# Patient Record
Sex: MALE | Race: WHITE | NOT HISPANIC OR LATINO | Employment: UNEMPLOYED | URBAN - METROPOLITAN AREA
[De-identification: names, ages, dates, MRNs, and addresses within clinical notes are randomized per-mention and may not be internally consistent; named-entity substitution may affect disease eponyms.]

---

## 2017-02-01 DIAGNOSIS — R56.9 SEIZURES (HCC): ICD-10-CM

## 2017-02-01 RX ORDER — LEVOCARNITINE 330 MG/1
TABLET ORAL
COMMUNITY
Start: 2017-01-04 | End: 2017-02-01 | Stop reason: SDUPTHER

## 2017-02-02 RX ORDER — DIVALPROEX SODIUM 500 MG/1
TABLET, DELAYED RELEASE ORAL
Qty: 150 TAB | Refills: 0 | Status: SHIPPED | OUTPATIENT
Start: 2017-02-02 | End: 2017-02-27 | Stop reason: SDUPTHER

## 2017-02-02 RX ORDER — LEVOCARNITINE 330 MG/1
330 TABLET ORAL DAILY
Qty: 30 TAB | Refills: 0 | Status: SHIPPED | OUTPATIENT
Start: 2017-02-02 | End: 2017-02-27 | Stop reason: SDUPTHER

## 2017-02-02 NOTE — TELEPHONE ENCOUNTER
Was the patient seen in the last year in this department? Yes  2/17/16    Does patient have an active prescription for medications requested? Yes     Received Request Via: Pharmacy     No follow up scheduled at this time.

## 2017-02-27 DIAGNOSIS — R56.9 SEIZURES (HCC): ICD-10-CM

## 2017-02-27 NOTE — TELEPHONE ENCOUNTER
Was the patient seen in the last year in this department? No  2/17/16    Does patient have an active prescription for medications requested? Yes     Received Request Via: Pharmacy    No follow up scheduled at this time.

## 2017-03-02 RX ORDER — DIVALPROEX SODIUM 500 MG/1
TABLET, DELAYED RELEASE ORAL
Qty: 75 TAB | Refills: 0 | Status: SHIPPED | OUTPATIENT
Start: 2017-03-02 | End: 2017-03-29 | Stop reason: SDUPTHER

## 2017-03-02 RX ORDER — LEVETIRACETAM 500 MG/1
1500 TABLET ORAL 2 TIMES DAILY
Qty: 90 TAB | Refills: 0 | Status: SHIPPED | OUTPATIENT
Start: 2017-03-02 | End: 2017-03-29 | Stop reason: SDUPTHER

## 2017-03-02 RX ORDER — LEVOCARNITINE 330 MG/1
330 TABLET ORAL DAILY
Qty: 30 TAB | Refills: 0 | Status: SHIPPED | OUTPATIENT
Start: 2017-03-02 | End: 2017-03-29 | Stop reason: SDUPTHER

## 2017-04-17 ENCOUNTER — OFFICE VISIT (OUTPATIENT)
Dept: NEUROLOGY | Facility: MEDICAL CENTER | Age: 52
End: 2017-04-17
Payer: MEDICAID

## 2017-04-17 VITALS
WEIGHT: 165 LBS | TEMPERATURE: 97.2 F | HEART RATE: 84 BPM | DIASTOLIC BLOOD PRESSURE: 78 MMHG | RESPIRATION RATE: 16 BRPM | BODY MASS INDEX: 20.51 KG/M2 | OXYGEN SATURATION: 96 % | HEIGHT: 75 IN | SYSTOLIC BLOOD PRESSURE: 106 MMHG

## 2017-04-17 DIAGNOSIS — F39 MOOD DISORDER (HCC): ICD-10-CM

## 2017-04-17 DIAGNOSIS — G40.909 SEIZURE DISORDER (HCC): ICD-10-CM

## 2017-04-17 DIAGNOSIS — F71 MR (MENTAL RETARDATION), MODERATE: ICD-10-CM

## 2017-04-17 DIAGNOSIS — M85.80 OSTEOPENIA: ICD-10-CM

## 2017-04-17 PROCEDURE — 99214 OFFICE O/P EST MOD 30 MIN: CPT | Performed by: NURSE PRACTITIONER

## 2017-04-17 RX ORDER — LEVETIRACETAM 500 MG/1
TABLET ORAL
Qty: 180 TAB | Refills: 11 | Status: SHIPPED | OUTPATIENT
Start: 2017-04-17 | End: 2017-08-24 | Stop reason: SDUPTHER

## 2017-04-17 RX ORDER — LEVOCARNITINE 330 MG/1
330 TABLET ORAL EVERY MORNING
Qty: 30 TAB | Refills: 11 | Status: SHIPPED | OUTPATIENT
Start: 2017-04-17 | End: 2018-04-16

## 2017-04-17 RX ORDER — DIVALPROEX SODIUM 500 MG/1
TABLET, DELAYED RELEASE ORAL
Qty: 150 TAB | Refills: 11 | Status: SHIPPED | OUTPATIENT
Start: 2017-04-17 | End: 2018-04-16 | Stop reason: SDUPTHER

## 2017-04-17 ASSESSMENT — ENCOUNTER SYMPTOMS
ABDOMINAL PAIN: 0
DOUBLE VISION: 0
NERVOUS/ANXIOUS: 0
DIARRHEA: 0
DEPRESSION: 1
DIZZINESS: 0
NAUSEA: 0
VOMITING: 0
HEADACHES: 0
COUGH: 0
SEIZURES: 0
WEIGHT LOSS: 1
MUSCULOSKELETAL NEGATIVE: 1
SORE THROAT: 0

## 2017-04-17 ASSESSMENT — PATIENT HEALTH QUESTIONNAIRE - PHQ9: CLINICAL INTERPRETATION OF PHQ2 SCORE: 0

## 2017-04-17 NOTE — MR AVS SNAPSHOT
"        Jn Pryor   2017 1:40 PM   Office Visit   MRN: [id number]    Department:  [facility]   Dept Phone:  [phone]    Description:  Male : 1965   Provider:  SETH Ramsey           Reason for Visit     Follow-Up Seizure      Allergies as of 2017     No Known Allergies      You were diagnosed with     Seizure disorder (CMS-HCC)   [027865]       Osteopenia   [573901]       MR (mental retardation), moderate   [660617]       Mood disorder (CMS-HCC)   [233828]         Vital Signs     Blood Pressure Pulse Temperature Respirations Height Weight    106/78 mmHg 84 36.2 °C (97.2 °F) 16 1.905 m (6' 3\") 74.844 kg (165 lb)    Body Mass Index Oxygen Saturation Smoking Status             20.62 kg/m2 96% Never Smoker          Basic Information     Date Of Birth Sex Race Ethnicity Preferred Language    1965 Male White Non- English      Problem List              ICD-10-CM Priority Class Noted - Resolved    Seizures (CMS-HCC) R56.9   2016 - Present    MR (mental retardation), moderate F71   2016 - Present    Osteopenia M85.80   2016 - Present    Mood disorder (CMS-HCC) F39   2016 - Present      Health Maintenance        Date Due Completion Dates    IMM DTaP/Tdap/Td Vaccine (1 - Tdap) 10/30/1985 ---    COLONOSCOPY 10/30/2016 ---            Current Immunizations     No immunizations on file.      Below and/or attached are the medications your provider expects you to take. Review all of your home medications and newly ordered medications with your provider and/or pharmacist. Follow medication instructions as directed by your provider and/or pharmacist. Please keep your medication list with you and share with your provider. Update the information when medications are discontinued, doses are changed, or new medications (including over-the-counter products) are added; and carry medication information at all times in the event of emergency situations    " Allergies:  No Known Allergies          Medications  Valid as of: April 17, 2017 -  2:12 PM    Generic Name Brand Name Tablet Size Instructions for use    Artificial Saliva (Solution) BIOTENE  Spray  in mouth/throat as needed.        Benztropine Mesylate (Tab) COGENTIN 1 MG Take 1 mg by mouth 2 times a day.        Calcium Carb-Cholecalciferol   Take  by mouth.        Diclofenac Sodium (Gel) Diclofenac Sodium 1 % Apply  to skin as directed.        Divalproex Sodium (Tablet Delayed Response) DEPAKOTE 500 MG TAKE 2 TABLET BY MOUTH EVERY MORNING AND 3 TABLETS EVERY NIGHT AT BEDTIME        Famotidine (Tab) PEPCID 20 MG Take 20 mg by mouth 2 times a day.        LevETIRAcetam (Tab) KEPPRA 500 MG TAKE 3 TABLETS BY MOUTH 2 TIMES A DAY        LevOCARNitine (Cap) Carnitine 250 MG Take 330 mg by mouth 3 times a day.        LevOCARNitine (Tab) CARNITOR 330 MG TAKE 1 TABLET BY MOUTH ONCE DAILY        LevOCARNitine (Tab) CARNITOR 330 MG Take 1 Tab by mouth every morning.        Multiple Vitamins-Minerals   Take  by mouth.        Nutritional Supplements   Take  by mouth.        RisperiDONE (Tab) RISPERDAL 2 MG Take 2 mg by mouth 2 times a day.        Sertraline HCl (Tab) ZOLOFT 100 MG Take 100 mg by mouth 2 Times a Day.        Sucralfate (Tab) CARAFATE 1 GM Take 1 g by mouth 3 times a day before meals.        Travoprost (Solution) TRAVATAN Z 0.004 % Place 1 Drop in both eyes every evening.        .                 Medicines prescribed today were sent to:     [facility] [place] [id number] [place] [address]    [address] [address]    Phone: [phone] Fax: [phone]    Open 24 Hours?: No      Medication refill instructions:       If your prescription bottle indicates you have medication refills left, it is not necessary to call your provider’s office. Please contact your pharmacy and they will refill your medication.    If your prescription bottle indicates you do not have any  refills left, you may request refills at any time through one of the following ways: The online Welltok system (except Urgent Care), by calling your provider’s office, or by asking your pharmacy to contact your provider’s office with a refill request. Medication refills are processed only during regular business hours and may not be available until the next business day. Your provider may request additional information or to have a follow-up visit with you prior to refilling your medication.   *Please Note: Medication refills are assigned a new Rx number when refilled electronically. Your pharmacy may indicate that no refills were authorized even though a new prescription for the same medication is available at the pharmacy. Please request the medicine by name with the pharmacy before contacting your provider for a refill.           Welltok Access Code: [email or web address]  Expires: 5/17/2017  1:02 PM    Welltok  A secure, online tool to manage your health information     [employer]’s Welltok® is a secure, online tool that connects you to your personalized health information from the privacy of your home -- day or night - making it very easy for you to manage your healthcare. Once the activation process is completed, you can even access your medical information using the Welltok diogenes, which is available for free in the Apple Diogenes store or Google Play store.     Welltok provides the following levels of access (as shown below):   My Chart Features   [facility] Doctor [facility]  Urgent  Care Non-[facility]  [facility] Care  Doctor   Email your healthcare team securely and privately 24/7 X X X    Manage appointments: schedule your next appointment; view details of past/upcoming appointments X      Request prescription refills. X      View recent personal medical records, including lab and immunizations X X X X   View health record, including health history, allergies, medications X X X X   Read reports  about your outpatient visits, procedures, consult and ER notes X X X X   See your discharge summary, which is a recap of your hospital and/or ER visit that includes your diagnosis, lab results, and care plan. X X       How to register for BlackJet:  1. Go to  [email or web address].  2. Click on the Sign Up Now box, which takes you to the New Member Sign Up page. You will need to provide the following information:  a. Enter your BlackJet Access Code exactly as it appears at the top of this page. (You will not need to use this code after you’ve completed the sign-up process. If you do not sign up before the expiration date, you must request a new code.)   b. Enter your date of birth.   c. Enter your home email address.   d. Click Submit, and follow the next screen’s instructions.  3. Create a BlackJet ID. This will be your BlackJet login ID and cannot be changed, so think of one that is secure and easy to remember.  4. Create a BlackJet password. You can change your password at any time.  5. Enter your Password Reset Question and Answer. This can be used at a later time if you forget your password.   6. Enter your e-mail address. This allows you to receive e-mail notifications when new information is available in BlackJet.  7. Click Sign Up. You can now view your health information.    For assistance activating your BlackJet account, call [phone]

## 2017-04-17 NOTE — PROGRESS NOTES
Subjective:      Jn Pryor is a 51 y.o. male who presents with Follow-Up for Seizure Disorder.  Here with caregiver today.       HPI  Mood disorder:  Followed by Dr Bills for psychiatrist (based out of [facility] and visits are conducted via tele-med).  Sometimes he may feel sad.  Generally speaking his mood has been stable.  No medication changes in a long time.  Sees his counselor weekly.    Last known seizure was in 2008.  He was experiencing a tremor which was reduced with starting carnitor.    No recent labs drawn.    Osteopenia:  Recognized on DEXA scan in 2008.  No apparent repeat study.    Now engaged to Deann Hyde-- proposed on April [birth year], her birthday.    Current Outpatient Prescriptions   Medication Sig Dispense Refill   • levocarnitine (CARNITOR) 330 MG Tab TAKE 1 TABLET BY MOUTH ONCE DAILY 15 Tab 0   • divalproex (DEPAKOTE) 500 MG Tablet Delayed Response TAKE 2 TABLET BY MOUTH EVERY MORNING AND 3 TABLETS EVERY NIGHT AT BEDTIME 40 Tab 0   • levetiracetam (KEPPRA) 500 MG Tab TAKE 3 TABLETS BY MOUTH 2 TIMES A DAY 90 Tab 0   • Carnitine 250 MG Cap Take 330 mg by mouth 3 times a day. 270 Cap 1   • risperidone (RISPERDAL) 2 MG TABS Take 2 mg by mouth 2 times a day.     • saliva substitute (BIOTENE) SOLN Spray  in mouth/throat as needed.     • Diclofenac Sodium (VOLTAREN) 1 % GEL Apply  to skin as directed.     • Nutritional Supplements (ENSURE CLEAR PO) Take  by mouth.     • sertraline (ZOLOFT) 100 MG TABS Take 100 mg by mouth 2 Times a Day.     • travoprost (TRAVATAN Z) 0.004 % SOLN Place 1 Drop in both eyes every evening.     • famotidine (PEPCID) 20 MG TABS Take 20 mg by mouth 2 times a day.     • Multiple Vitamins-Minerals (MULTILEX-T&M PO) Take  by mouth.     • benztropine (COGENTIN) 1 MG TABS Take 1 mg by mouth 2 times a day.     • Calcium Carb-Cholecalciferol (OYSTER SHELL CALCIUM + D PO) Take  by mouth.     • sucralfate (CARAFATE) 1 GM TABS Take 1 g by mouth 3 times a day before meals.    "    No current facility-administered medications for this visit.       Review of Systems   Constitutional: Positive for weight loss.   HENT: Negative for hearing loss, nosebleeds and sore throat.         No recent head injury.   Eyes: Negative for double vision.        No new loss of vision.   Respiratory: Negative for cough.         No recent lung infections.   Cardiovascular: Negative for chest pain.   Gastrointestinal: Negative for nausea, vomiting, abdominal pain and diarrhea.   Genitourinary: Negative.    Musculoskeletal: Negative.    Skin: Negative.    Neurological: Negative for dizziness, seizures and headaches.   Endo/Heme/Allergies:        No history of endocrine dysfunction.  No new problems.   Psychiatric/Behavioral: Positive for depression. The patient is not nervous/anxious.         No recent mood changes.          Objective:     /78 mmHg  Pulse 84  Temp(Src) 36.2 °C (97.2 °F)  Resp 16  Ht 1.905 m (6' 3\")  Wt 74.844 kg (165 lb)  BMI 20.62 kg/m2  SpO2 96%     Physical Exam   Constitutional: He is oriented to person, place, and time. He appears well-developed and well-nourished. No distress.   HENT:   Head: Normocephalic and atraumatic.   Nose: Nose normal.   Mouth/Throat: Oropharynx is clear and moist.   No new hearing loss.    Wears glasses    Narrowed anterior skull and forehead   Eyes: EOM are normal.   No double vision or loss of vision.   Neck: Normal range of motion.   Cardiovascular: Normal rate, regular rhythm and normal heart sounds.    No recent chest pain.   Pulmonary/Chest: Effort normal.   Abdominal: There is no tenderness.   Genitourinary:   No recent infections.   Musculoskeletal: Normal range of motion.   Lymphadenopathy:     He has no cervical adenopathy.   Neurological: He is alert and oriented to person, place, and time. He has normal strength. He displays no tremor. No cranial nerve deficit. He displays no seizure activity. Gait (broad based gait) abnormal.   Reflex " Scores:       Tricep reflexes are 3+ on the right side and 3+ on the left side.       Bicep reflexes are 3+ on the right side and 3+ on the left side.       Brachioradialis reflexes are 3+ on the right side and 3+ on the left side.  No observable changes in neurologic status.  See initial new patient examination for details.     Skin: Skin is warm and dry. There is pallor.   Psychiatric: His speech is normal. His mood appears not anxious. Cognition and memory are impaired. He expresses inappropriate judgment. He exhibits a depressed mood.         Assessment/Plan:     Seizure Disorder:  Last known seizure occurred in 2008.  No concern for seizures in the past year.    Mood disorder:  Overall stable. Followed by tele-medicine located in [place].  No recent medication changes.    Moderate MR:  Lives in group home full-time.  Works in a sheltered environment.    Osteoporosis:  Last DEXA 2008.  Continue supplements as recommended.    Continue LEV 1500mg BID and VPA 1000mg-1500mg.  Continue Carnitor qam.    Obtain labs as ordered.    Return for follow-up in one year.

## 2017-08-24 DIAGNOSIS — G40.909 SEIZURE DISORDER (HCC): ICD-10-CM

## 2017-08-24 RX ORDER — LEVETIRACETAM 500 MG/1
TABLET ORAL
Qty: 180 TAB | Refills: 5 | Status: SHIPPED | OUTPATIENT
Start: 2017-08-24 | End: 2018-04-16 | Stop reason: SDUPTHER

## 2017-09-01 ENCOUNTER — TELEPHONE (OUTPATIENT)
Dept: NEUROLOGY | Facility: MEDICAL CENTER | Age: 52
End: 2017-09-01

## 2017-09-01 NOTE — TELEPHONE ENCOUNTER
Received a faxed message stating that patient during his recent hospital/rehab stay had had a dosage change on his Divalproex SOD DR 500mg. His dosing per you was 1000mg-1500mg. The hospital changed it to 500mg-1500mg. Called and spoke to the Medical Coordinator for patient's group home and they stated that the medication was changed roughly 3 weeks ago, with no seizure activity seen.    They are wondering if they should stay with the new dosage or return to how you had it? Please advise.

## 2017-09-05 NOTE — TELEPHONE ENCOUNTER
Spoke with Rebecca Lennon- Medical Coordinator for patient's care home, and relayed all information regarding patient's depakote dosage. She understood and they will keep his dosage at 500mg-1500mg. They will call us if patient starts having seizures.

## 2018-04-16 ENCOUNTER — OFFICE VISIT (OUTPATIENT)
Dept: NEUROLOGY | Facility: MEDICAL CENTER | Age: 53
End: 2018-04-16
Payer: MEDICAID

## 2018-04-16 VITALS
BODY MASS INDEX: 21.22 KG/M2 | HEIGHT: 75 IN | DIASTOLIC BLOOD PRESSURE: 70 MMHG | WEIGHT: 170.64 LBS | HEART RATE: 94 BPM | SYSTOLIC BLOOD PRESSURE: 110 MMHG

## 2018-04-16 DIAGNOSIS — G40.909 SEIZURE DISORDER (HCC): ICD-10-CM

## 2018-04-16 DIAGNOSIS — F71 MR (MENTAL RETARDATION), MODERATE: ICD-10-CM

## 2018-04-16 DIAGNOSIS — M85.80 OSTEOPENIA, UNSPECIFIED LOCATION: ICD-10-CM

## 2018-04-16 DIAGNOSIS — F39 MOOD DISORDER (HCC): ICD-10-CM

## 2018-04-16 PROCEDURE — 99214 OFFICE O/P EST MOD 30 MIN: CPT | Performed by: NURSE PRACTITIONER

## 2018-04-16 RX ORDER — ALENDRONATE SODIUM 70 MG/1
70 TABLET ORAL
COMMUNITY

## 2018-04-16 RX ORDER — LEVETIRACETAM 500 MG/1
TABLET ORAL
Qty: 180 TAB | Refills: 11 | Status: SHIPPED | OUTPATIENT
Start: 2018-04-16 | End: 2018-10-17 | Stop reason: SDUPTHER

## 2018-04-16 RX ORDER — NAPROXEN 500 MG/1
500 TABLET ORAL 2 TIMES DAILY WITH MEALS
COMMUNITY

## 2018-04-16 RX ORDER — POTASSIUM CHLORIDE 1.5 G/1.58G
20 POWDER, FOR SOLUTION ORAL 2 TIMES DAILY
COMMUNITY

## 2018-04-16 RX ORDER — LEVOCARNITINE 330 MG/1
330 TABLET ORAL 3 TIMES DAILY
Qty: 90 TAB | Refills: 11 | Status: SHIPPED | OUTPATIENT
Start: 2018-04-16 | End: 2018-10-17

## 2018-04-16 RX ORDER — DIVALPROEX SODIUM 500 MG/1
TABLET, DELAYED RELEASE ORAL
Qty: 150 TAB | Refills: 11 | Status: SHIPPED | OUTPATIENT
Start: 2018-04-16 | End: 2018-10-17 | Stop reason: SDUPTHER

## 2018-04-16 ASSESSMENT — ENCOUNTER SYMPTOMS
NERVOUS/ANXIOUS: 0
WEIGHT LOSS: 0
HEADACHES: 0
NAUSEA: 0
VOMITING: 0
DIARRHEA: 0
DEPRESSION: 1
COUGH: 0
SORE THROAT: 0
SEIZURES: 0
DOUBLE VISION: 0
ABDOMINAL PAIN: 0
MUSCULOSKELETAL NEGATIVE: 1

## 2018-04-16 NOTE — PROGRESS NOTES
Subjective:      Jn Pryor is a 52 y.o. male who presents with Seizure (1 year follow up)        Here with a female caregiver today.    HPI  Mood disorder:  Followed by Dr Bills for psychiatrist (based out of [facility] and visits are conducted via tele-med).  Sometimes he may feel sad.  Generally speaking his mood has been stable.  No medication changes in a long time.  Sees his counselor weekly.     Last known seizure was in 2008.  He was experiencing a tremor which was reduced with starting carnitor.     No recent labs drawn.     Osteopenia:  Recognized on DEXA scan in 2008.  No apparent repeat study.    Lives in a group home.  He does crossword puzzles, drawing, and reading.  He helps with chores as well.  On Tuesdays he attends the [email or web address] to volunteer.    He no longer has a lady in his life.    Current Outpatient Prescriptions   Medication Sig Dispense Refill   • alendronate (FOSAMAX) 70 MG Tab Take 70 mg by mouth every 7 days.     • potassium chloride (KLOR-CON) 20 MEQ Pack Take 20 mEq by mouth 2 times a day.     • naproxen (NAPROSYN) 500 MG Tab Take 500 mg by mouth 2 times a day, with meals.     • levetiracetam (KEPPRA) 500 MG Tab TAKE 3 TABLETS BY MOUTH 2 TIMES A  Tab 5   • divalproex (DEPAKOTE) 500 MG Tablet Delayed Response TAKE 2 TABLET BY MOUTH EVERY MORNING AND 3 TABLETS EVERY NIGHT AT BEDTIME 150 Tab 11   • levocarnitine (CARNITOR) 330 MG Tab TAKE 1 TABLET BY MOUTH ONCE DAILY 15 Tab 0   • risperidone (RISPERDAL) 2 MG TABS Take 2 mg by mouth 2 times a day.     • saliva substitute (BIOTENE) SOLN Spray  in mouth/throat as needed.     • Diclofenac Sodium (VOLTAREN) 1 % GEL Apply  to skin as directed.     • Nutritional Supplements (ENSURE CLEAR PO) Take  by mouth.     • sertraline (ZOLOFT) 100 MG TABS Take 100 mg by mouth 2 Times a Day.     • travoprost (TRAVATAN Z) 0.004 % SOLN Place 1 Drop in both eyes every evening.     • famotidine (PEPCID) 20 MG TABS Take 20 mg by mouth 2 times a  "day.     • Multiple Vitamins-Minerals (MULTILEX-T&M PO) Take  by mouth.     • benztropine (COGENTIN) 1 MG TABS Take 1 mg by mouth 2 times a day.     • Calcium Carb-Cholecalciferol (OYSTER SHELL CALCIUM + D PO) Take  by mouth.     • Carnitine 250 MG Cap Take 330 mg by mouth 3 times a day. 270 Cap 1     No current facility-administered medications for this visit.        Review of Systems   Constitutional: Negative for weight loss (weight gain of 5 pounds).   HENT: Negative for hearing loss, nosebleeds and sore throat.         No recent head injury.   Eyes: Negative for double vision.        No new loss of vision.   Respiratory: Negative for cough.         No recent lung infections.   Cardiovascular: Negative for chest pain.   Gastrointestinal: Negative for abdominal pain, diarrhea, nausea and vomiting.   Genitourinary: Negative.    Musculoskeletal: Negative.    Skin: Negative.    Neurological: Negative for seizures and headaches.   Endo/Heme/Allergies:        No history of endocrine dysfunction.  No new problems.   Psychiatric/Behavioral: Positive for depression (chronic). The patient is not nervous/anxious.         No recent mood changes.          Objective:     /70   Pulse 94   Ht 1.905 m (6' 3\")   Wt 77.4 kg (170 lb 10.2 oz)   BMI 21.33 kg/m²      Physical Exam   Constitutional: He appears well-developed and well-nourished. No distress.   HENT:   Head: Normocephalic and atraumatic.   Nose: Nose normal.   No new hearing loss.  Narrowed anterior skull and forehead    Eyes: EOM are normal.   Wears glasses  No double vision or loss of vision.   Neck: Normal range of motion.   Cardiovascular: Normal rate, regular rhythm and normal heart sounds.    No recent chest pain.   Pulmonary/Chest: Effort normal.   Abdominal: There is no tenderness.   Genitourinary:   Genitourinary Comments: No recent infections.   Musculoskeletal: Normal range of motion.   Lymphadenopathy:     He has no cervical adenopathy. "   Neurological: He is alert. He has normal strength. He displays no tremor. No cranial nerve deficit. He displays no seizure activity. Gait (broad based gait) abnormal.   Reflex Scores:       Tricep reflexes are 3+ on the right side and 3+ on the left side.       Bicep reflexes are 3+ on the right side and 3+ on the left side.       Brachioradialis reflexes are 3+ on the right side and 3+ on the left side.  No observable changes in neurologic status.  See initial new patient examination for details.     Skin: Skin is warm and dry. There is pallor.   Psychiatric: His speech is normal. His mood appears not anxious. He expresses inappropriate judgment. He exhibits a depressed mood.               Assessment/Plan:     Seizure Disorder:  Last known seizure occurred in 2008.  No concern for seizures in the past year.     Mood disorder:  Overall stable. Followed by tele-medicine located in [place].  No recent medication changes.     Moderate MR:  Lives in group home full-time.  Works in a sheltered environment.     Osteoporosis:  Last DEXA 2008.  Continue supplements as recommended.     Continue LEV 1500mg BID and VPA 1000mg-1500mg.  Continue Carnitor qam.     Obtain labs as ordered.     Return for follow-up in one year.

## 2018-04-27 LAB
25(OH)D3+25(OH)D2 SERPL-MCNC: 45.7 NG/ML (ref 30–100)
ALBUMIN SERPL-MCNC: 4 G/DL (ref 3.5–5.5)
ALBUMIN/GLOB SERPL: 2.1 {RATIO} (ref 1.2–2.2)
ALP SERPL-CCNC: 64 IU/L (ref 39–117)
AST SERPL-CCNC: 19 IU/L (ref 0–40)
BASOPHILS # BLD AUTO: 0 X10E3/UL (ref 0–0.2)
BASOPHILS NFR BLD AUTO: 1 %
BILIRUB SERPL-MCNC: 0.3 MG/DL (ref 0–1.2)
BUN SERPL-MCNC: 16 MG/DL (ref 6–24)
BUN/CREAT SERPL: 22 (ref 9–20)
CALCIUM SERPL-MCNC: 9.1 MG/DL (ref 8.7–10.2)
CHLORIDE SERPL-SCNC: 103 MMOL/L (ref 96–106)
CREAT SERPL-MCNC: 0.73 MG/DL (ref 0.76–1.27)
EOSINOPHIL # BLD AUTO: 0.6 X10E3/UL (ref 0–0.4)
EOSINOPHIL NFR BLD AUTO: 13 %
ERYTHROCYTE [DISTWIDTH] IN BLOOD BY AUTOMATED COUNT: 14.6 % (ref 12.3–15.4)
GFR SERPLBLD CREATININE-BSD FMLA CKD-EPI: 107 ML/MIN/1.73
GFR SERPLBLD CREATININE-BSD FMLA CKD-EPI: 124 ML/MIN/1.73
GLOBULIN SER CALC-MCNC: 1.9 G/DL (ref 1.5–4.5)
GLUCOSE SERPL-MCNC: 92 MG/DL (ref 65–99)
HCT VFR BLD AUTO: 34.8 % (ref 37.5–51)
HGB BLD-MCNC: 11.6 G/DL (ref 13–17.7)
IMM GRANULOCYTES # BLD: 0 X10E3/UL (ref 0–0.1)
IMM GRANULOCYTES NFR BLD: 1 %
IMMATURE CELLS  115398: ABNORMAL
LEVETIRACETAM SERPL-MCNC: 39 UG/ML (ref 10–40)
LYMPHOCYTES # BLD AUTO: 0.8 X10E3/UL (ref 0.7–3.1)
LYMPHOCYTES NFR BLD AUTO: 17 %
MCH RBC QN AUTO: 32.7 PG (ref 26.6–33)
MCHC RBC AUTO-ENTMCNC: 33.3 G/DL (ref 31.5–35.7)
MCV RBC AUTO: 98 FL (ref 79–97)
MONOCYTES # BLD AUTO: 0.6 X10E3/UL (ref 0.1–0.9)
MONOCYTES NFR BLD AUTO: 13 %
MORPHOLOGY BLD-IMP: ABNORMAL
NEUTROPHILS # BLD AUTO: 2.6 X10E3/UL (ref 1.4–7)
NEUTROPHILS NFR BLD AUTO: 55 %
NRBC BLD AUTO-RTO: ABNORMAL %
PLATELET # BLD AUTO: 103 X10E3/UL (ref 150–379)
POTASSIUM SERPL-SCNC: 4.5 MMOL/L (ref 3.5–5.2)
PROT SERPL-MCNC: 5.9 G/DL (ref 6–8.5)
RBC # BLD AUTO: 3.55 X10E6/UL (ref 4.14–5.8)
SODIUM SERPL-SCNC: 144 MMOL/L (ref 134–144)
VALPROATE SERPL-MCNC: 68 UG/ML (ref 50–100)
WBC # BLD AUTO: 4.7 X10E3/UL (ref 3.4–10.8)

## 2018-05-10 ENCOUNTER — TELEPHONE (OUTPATIENT)
Dept: NEUROLOGY | Facility: MEDICAL CENTER | Age: 53
End: 2018-05-10

## 2018-05-11 NOTE — TELEPHONE ENCOUNTER
Spoke with patient's caregiver, she states that the anemia is nothing new. That he's had that for quite some time.

## 2018-10-17 ENCOUNTER — OFFICE VISIT (OUTPATIENT)
Dept: NEUROLOGY | Facility: MEDICAL CENTER | Age: 53
End: 2018-10-17
Payer: MEDICAID

## 2018-10-17 VITALS
HEIGHT: 75 IN | HEART RATE: 78 BPM | BODY MASS INDEX: 21.82 KG/M2 | DIASTOLIC BLOOD PRESSURE: 60 MMHG | RESPIRATION RATE: 18 BRPM | OXYGEN SATURATION: 95 % | SYSTOLIC BLOOD PRESSURE: 104 MMHG | WEIGHT: 175.49 LBS | TEMPERATURE: 97 F

## 2018-10-17 DIAGNOSIS — F39 MOOD DISORDER (HCC): ICD-10-CM

## 2018-10-17 DIAGNOSIS — F71 MR (MENTAL RETARDATION), MODERATE: ICD-10-CM

## 2018-10-17 DIAGNOSIS — M81.8 OTHER OSTEOPOROSIS, UNSPECIFIED PATHOLOGICAL FRACTURE PRESENCE: ICD-10-CM

## 2018-10-17 DIAGNOSIS — G40.909 SEIZURE DISORDER (HCC): ICD-10-CM

## 2018-10-17 PROBLEM — M81.0 OSTEOPOROSIS: Status: ACTIVE | Noted: 2018-10-17

## 2018-10-17 PROCEDURE — 99214 OFFICE O/P EST MOD 30 MIN: CPT | Performed by: NURSE PRACTITIONER

## 2018-10-17 RX ORDER — DIVALPROEX SODIUM 500 MG/1
TABLET, DELAYED RELEASE ORAL
Qty: 150 TAB | Refills: 11 | Status: SHIPPED | OUTPATIENT
Start: 2018-10-17 | End: 2019-09-23 | Stop reason: SDUPTHER

## 2018-10-17 RX ORDER — LEVETIRACETAM 500 MG/1
TABLET ORAL
Qty: 180 TAB | Refills: 11 | Status: SHIPPED | OUTPATIENT
Start: 2018-10-17 | End: 2019-10-11 | Stop reason: SDUPTHER

## 2018-10-17 RX ORDER — LEVOCARNITINE 330 MG/1
TABLET ORAL
Qty: 30 TAB | Refills: 11 | Status: SHIPPED | OUTPATIENT
Start: 2018-10-17 | End: 2019-10-11 | Stop reason: SDUPTHER

## 2018-10-17 ASSESSMENT — PATIENT HEALTH QUESTIONNAIRE - PHQ9: CLINICAL INTERPRETATION OF PHQ2 SCORE: 0

## 2018-10-17 ASSESSMENT — ENCOUNTER SYMPTOMS
DEPRESSION: 1
NAUSEA: 0
CONSTITUTIONAL NEGATIVE: 1
DIARRHEA: 0
MUSCULOSKELETAL NEGATIVE: 1
ABDOMINAL PAIN: 1
CONSTIPATION: 1
NERVOUS/ANXIOUS: 0
SORE THROAT: 0
VOMITING: 0
COUGH: 0
HEADACHES: 0
DOUBLE VISION: 0

## 2018-10-17 NOTE — PROGRESS NOTES
Subjective:      Jn Pryor is a 52 y.o. male who presents with Follow-Up (Seizure disorder )     Here with case coordinator today.        HPI  Just returned from his family in [place].  Yesterday, his tummy was hurting.    Mood disorder:  Followed by Dr Bills for psychiatrist (based out of [facility] and visits are conducted via tele-med).  Sometimes he may feel sad.  Generally speaking his mood has been stable.  No medication changes in a long time.  Sees his counselor weekly.     Last known seizure was in 2008.  He was experiencing a tremor which was reduced with starting carnitor.      Osteopenia:  Recognized on DEXA scan in 2008.  No apparent repeat study.     Lives in a group home.  He does crossword puzzles, drawing, and reading.  He helps with chores as well.  On Tuesdays he attends the [employer] to volunteer.      Ref. Range 4/23/2018 08:25   Levetiracetam S Latest Ref Range: 10.0 - 40.0 ug/mL 39.0   Valproic Acid Latest Ref Range: 50 - 100 ug/mL 68     Gluten and dairy intolerant.    Current Outpatient Prescriptions   Medication Sig Dispense Refill   • alendronate (FOSAMAX) 70 MG Tab Take 70 mg by mouth every 7 days.     • potassium chloride (KLOR-CON) 20 MEQ Pack Take 20 mEq by mouth 2 times a day.     • naproxen (NAPROSYN) 500 MG Tab Take 500 mg by mouth 2 times a day, with meals.     • levETIRAcetam (KEPPRA) 500 MG Tab TAKE 3 TABLETS BY MOUTH 2 TIMES A  Tab 11   • divalproex (DEPAKOTE) 500 MG Tablet Delayed Response TAKE 2 TABLET BY MOUTH EVERY MORNING AND 3 TABLETS EVERY NIGHT AT BEDTIME 150 Tab 11   • levocarnitine (CARNITOR) 330 MG Tab TAKE 1 TABLET BY MOUTH ONCE DAILY 15 Tab 0   • risperidone (RISPERDAL) 2 MG TABS Take 2 mg by mouth 2 times a day.     • saliva substitute (BIOTENE) SOLN Spray  in mouth/throat as needed.     • Diclofenac Sodium (VOLTAREN) 1 % GEL Apply  to skin as directed.     • Nutritional Supplements (ENSURE CLEAR PO) Take  by mouth.     • sertraline (ZOLOFT) 100  "MG TABS Take 100 mg by mouth 2 Times a Day.     • travoprost (TRAVATAN Z) 0.004 % SOLN Place 1 Drop in both eyes every evening.     • famotidine (PEPCID) 20 MG TABS Take 20 mg by mouth 2 times a day.     • Multiple Vitamins-Minerals (MULTILEX-T&M PO) Take  by mouth.     • benztropine (COGENTIN) 1 MG TABS Take 1 mg by mouth 2 times a day.     • Calcium Carb-Cholecalciferol (OYSTER SHELL CALCIUM + D PO) Take  by mouth.     • levOCARNitine (CARNITOR) 330 MG Tab Take 1 Tab by mouth 3 times a day. 90 Tab 11   • Carnitine 250 MG Cap Take 330 mg by mouth 3 times a day. (Patient not taking: Reported on 10/17/2018) 270 Cap 1     No current facility-administered medications for this visit.        Review of Systems   Constitutional: Negative.  Weight loss: weight gain.   HENT: Negative for hearing loss, nosebleeds and sore throat.         No recent head injury.   Eyes: Negative for double vision.        No new loss of vision.   Respiratory: Negative for cough.         No recent lung infections.   Cardiovascular: Negative for chest pain.   Gastrointestinal: Positive for abdominal pain and constipation. Negative for diarrhea, nausea and vomiting.   Genitourinary: Negative.    Musculoskeletal: Negative.    Skin: Negative.    Neurological: Negative for headaches.   Endo/Heme/Allergies:        No history of endocrine dysfunction.  No new problems.   Psychiatric/Behavioral: Positive for depression (chronic). The patient is not nervous/anxious.         No recent mood changes.          Objective:     /60   Pulse 78   Temp 36.1 °C (97 °F)   Resp 18   Ht 1.905 m (6' 3\")   Wt 79.6 kg (175 lb 7.8 oz)   SpO2 95%   BMI 21.93 kg/m²      Physical Exam   Constitutional: He is oriented to person, place, and time. He appears well-developed and well-nourished. No distress.   HENT:   Head: Atraumatic.   Nose: Nose normal.   Narrowed anterior skull and forehead     Eyes: EOM are normal.   No double vision or loss of vision.    Wears " glasses   Neck: Normal range of motion.   Cardiovascular: Normal rate, regular rhythm and normal heart sounds.    No recent chest pain.   Pulmonary/Chest: Effort normal and breath sounds normal.   Abdominal: There is no tenderness.   Genitourinary:   Genitourinary Comments: No recent infections.   Lymphadenopathy:     He has no cervical adenopathy.   Neurological: He is alert and oriented to person, place, and time. He has normal strength. He displays no tremor. No cranial nerve deficit. He displays no seizure activity. Gait ( broad based gait) abnormal.   Reflex Scores:       Tricep reflexes are 3+ on the right side and 3+ on the left side.       Bicep reflexes are 3+ on the right side and 3+ on the left side.       Brachioradialis reflexes are 3+ on the right side and 3+ on the left side.  No observable changes in neurologic status.  See initial new patient examination for details.     Skin: Skin is warm and dry. There is pallor.   Psychiatric: His mood appears not anxious. His affect is labile. His speech is delayed. He expresses impulsivity. He exhibits a depressed mood.               Assessment/Plan:     Seizure Disorder:  Last known seizure occurred in 2008.  No concern for seizures in the past year.     Mood disorder:  Overall stable. Followed by tele-medicine located in [place].  No recent medication changes.     Moderate MR:  Lives in group home full-time.  Works in a sheltered environment.     Osteoporosis:  Last DEXA 2016.  Continue supplements as recommended.  Followed by PCP.     Continue LEV 1500mg BID and VPA 1000mg-1500mg.  Continue Carnitor qam.     Return for follow-up in 6 months.

## 2019-04-15 ENCOUNTER — TELEPHONE (OUTPATIENT)
Dept: NEUROLOGY | Facility: MEDICAL CENTER | Age: 54
End: 2019-04-15

## 2019-04-15 ENCOUNTER — OFFICE VISIT (OUTPATIENT)
Dept: NEUROLOGY | Facility: MEDICAL CENTER | Age: 54
End: 2019-04-15
Payer: MEDICAID

## 2019-04-15 VITALS
TEMPERATURE: 97.5 F | HEART RATE: 85 BPM | SYSTOLIC BLOOD PRESSURE: 122 MMHG | DIASTOLIC BLOOD PRESSURE: 64 MMHG | BODY MASS INDEX: 21.14 KG/M2 | OXYGEN SATURATION: 98 % | WEIGHT: 170 LBS | HEIGHT: 75 IN | RESPIRATION RATE: 16 BRPM

## 2019-04-15 DIAGNOSIS — F71 MR (MENTAL RETARDATION), MODERATE: ICD-10-CM
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
## 2019-04-15 DIAGNOSIS — M81.8 OTHER OSTEOPOROSIS, UNSPECIFIED PATHOLOGICAL FRACTURE PRESENCE: ICD-10-CM

## 2019-04-15 DIAGNOSIS — F39 MOOD DISORDER (HCC): ICD-10-CM

## 2019-04-15 DIAGNOSIS — R56.9 SEIZURES (HCC): ICD-10-CM

## 2019-04-15 PROCEDURE — 99214 OFFICE O/P EST MOD 30 MIN: CPT | Performed by: NURSE PRACTITIONER

## 2019-04-15 NOTE — PROGRESS NOTES
Subjective:      Jn Pryor is a 53 y.o. male who presents with Follow-Up (Seizure disorder)        Here with case coordinator today, new to his care.    HPI   Has fallen 5-6 times within 4 months.  He was last in the ED for right brow injury sustained from a fall.  Had labs drawn on 3/20/2019.    Mood disorder:  Followed by Dr Bills for psychiatry (based out [facility] and visits are conducted via tele-med).  Sometimes he may feel sad.  Generally speaking his mood has been stable.  No medication changes in a long time.  Sees his counselor weekly.     Last known seizure was in 2008.  He was experiencing a tremor which was reduced with starting carnitor.     No recent labs drawn.     Osteopenia:  Recognized on DEXA scan in 2008.  No apparent repeat study.     Lives in a group home.  He does crossword puzzles, drawing, and reading.  He helps with chores as well.  [facility]ues[facility] [employer] [facility].       Ref. Range 4/23/2018 08:25   Levetiracetam S Latest Ref Range: 10.0 - 40.0 ug/mL 39.0   Valproic Acid Latest Ref Range: 50 - 100 ug/mL 68       Current Outpatient Prescriptions   Medication Sig Dispense Refill   • levOCARNitine (CARNITOR) 330 MG Tab TAKE 1 TABLET BY MOUTH ONCE DAILY 30 Tab 11   • levETIRAcetam (KEPPRA) 500 MG Tab TAKE 3 TABLETS BY MOUTH 2 TIMES A  Tab 11   • divalproex (DEPAKOTE) 500 MG Tablet Delayed Response TAKE 2 TABLET BY MOUTH EVERY MORNING AND 3 TABLETS EVERY NIGHT AT BEDTIME 150 Tab 11   • alendronate (FOSAMAX) 70 MG Tab Take 70 mg by mouth every 7 days.     • potassium chloride (KLOR-CON) 20 MEQ Pack Take 20 mEq by mouth 2 times a day.     • naproxen (NAPROSYN) 500 MG Tab Take 500 mg by mouth 2 times a day, with meals.     • risperidone (RISPERDAL) 2 MG TABS Take 2 mg by mouth 2 times a day.     • saliva substitute (BIOTENE) SOLN Spray  in mouth/throat as needed.     • Diclofenac Sodium (VOLTAREN) 1 % GEL Apply  to skin as directed.     • Nutritional  "Supplements (ENSURE CLEAR PO) Take  by mouth.     • sertraline (ZOLOFT) 100 MG TABS Take 100 mg by mouth 2 Times a Day.     • travoprost (TRAVATAN Z) 0.004 % SOLN Place 1 Drop in both eyes every evening.     • famotidine (PEPCID) 20 MG TABS Take 20 mg by mouth 2 times a day.     • Multiple Vitamins-Minerals (MULTILEX-T&M PO) Take  by mouth.     • benztropine (COGENTIN) 1 MG TABS Take 1 mg by mouth 2 times a day.     • Calcium Carb-Cholecalciferol (OYSTER SHELL CALCIUM + D PO) Take  by mouth.       No current facility-administered medications for this visit.        Review of Systems   Constitutional: Weight loss: weight gain.   HENT: Negative for hearing loss, nosebleeds and sore throat.         No recent head injury.   Eyes: Negative for double vision.        No new loss of vision.   Respiratory: Negative for cough.         No recent lung infections.   Cardiovascular: Negative for chest pain.   Gastrointestinal: Positive for constipation. Negative for abdominal pain, diarrhea, nausea and vomiting.   Genitourinary: Negative.    Musculoskeletal: Positive for falls.   Skin: Negative.    Neurological: Negative for seizures and headaches.   Endo/Heme/Allergies:        No history of endocrine dysfunction.  No new problems.   Psychiatric/Behavioral: Positive for depression (chronic) and memory loss (chronic). The patient is not nervous/anxious.         No recent mood changes.          Objective:     /64 (BP Location: Left arm, Patient Position: Sitting, BP Cuff Size: Adult)   Pulse 85   Temp 36.4 °C (97.5 °F) (Temporal)   Resp 16   Ht 1.905 m (6' 3\")   Wt 77.1 kg (170 lb)   SpO2 98%   BMI 21.25 kg/m²      Physical Exam   Constitutional: He is oriented to person, place, and time. He appears well-developed and well-nourished. No distress.   HENT:   Head: Normocephalic and atraumatic.   Eyes: EOM are normal.   Wears glasses   Cardiovascular: Normal rate and regular rhythm.    Pulmonary/Chest: Effort normal. No " respiratory distress.   Musculoskeletal: Normal range of motion.   Neurological: He is alert and oriented to person, place, and time. He has normal strength. No cranial nerve deficit. He exhibits normal muscle tone. Coordination abnormal.   Reflex Scores:       Tricep reflexes are 3+ on the right side and 3+ on the left side.       Bicep reflexes are 3+ on the right side and 3+ on the left side.       Brachioradialis reflexes are 3+ on the right side and 3+ on the left side.  Skin: Skin is warm and dry. There is pallor.   Psychiatric: His affect is labile. He expresses impulsivity. He exhibits a depressed mood.   Foot drop Right >left               Assessment/Plan:     Seizure Disorder:  Last known seizure occurred in 2008.  No concern for seizures in the past year.     Mood disorder:  Overall stable. Followed by tele-medicine located in [place].  No recent medication changes.     Moderate MR:  Lives in group home full-time.  Works in a sheltered environment.     Osteoporosis:  Last DEXA 2016.  Continue supplements as recommended.  Followed by PCP.    New, Falls:  5-6 falls within the past 4 months.  Referral placed to gait training through physical therapy.     Continue LEV 1500mg BID and VPA 1000mg-1500mg.  Continue Carnitor qam.     Return for follow-up in 6 months.

## 2019-04-16 ASSESSMENT — ENCOUNTER SYMPTOMS
MEMORY LOSS: 1
SORE THROAT: 0
NERVOUS/ANXIOUS: 0
HEADACHES: 0
DIARRHEA: 0
DOUBLE VISION: 0
CONSTIPATION: 1
NAUSEA: 0
SEIZURES: 0
FALLS: 1
VOMITING: 0
DEPRESSION: 1
COUGH: 0
ABDOMINAL PAIN: 0

## 2019-09-24 ENCOUNTER — TELEPHONE (OUTPATIENT)
Dept: NEUROLOGY | Facility: MEDICAL CENTER | Age: 54
End: 2019-09-24

## 2019-09-24 ENCOUNTER — OFFICE VISIT (OUTPATIENT)
Dept: NEUROLOGY | Facility: MEDICAL CENTER | Age: 54
End: 2019-09-24
Payer: MEDICAID

## 2019-09-24 VITALS
DIASTOLIC BLOOD PRESSURE: 80 MMHG | HEART RATE: 66 BPM | SYSTOLIC BLOOD PRESSURE: 122 MMHG | TEMPERATURE: 97 F | BODY MASS INDEX: 21 KG/M2 | OXYGEN SATURATION: 97 % | RESPIRATION RATE: 16 BRPM | WEIGHT: 168 LBS

## 2019-09-24 DIAGNOSIS — R56.9 SEIZURES (HCC): ICD-10-CM

## 2019-09-24 DIAGNOSIS — M81.8 OTHER OSTEOPOROSIS, UNSPECIFIED PATHOLOGICAL FRACTURE PRESENCE: ICD-10-CM

## 2019-09-24 DIAGNOSIS — F39 MOOD DISORDER (HCC): ICD-10-CM

## 2019-09-24 DIAGNOSIS — F71 MODERATE INTELLECTUAL DISABILITY: ICD-10-CM
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
## 2019-09-24 PROCEDURE — 99215 OFFICE O/P EST HI 40 MIN: CPT | Performed by: NURSE PRACTITIONER

## 2019-09-24 ASSESSMENT — ENCOUNTER SYMPTOMS
DIARRHEA: 0
CONSTIPATION: 1
DIZZINESS: 0
FALLS: 1
HEADACHES: 0
MEMORY LOSS: 1
NAUSEA: 0
ABDOMINAL PAIN: 0
DOUBLE VISION: 0
COUGH: 0
DEPRESSION: 1
SORE THROAT: 0
VOMITING: 0
WEIGHT LOSS: 1
NERVOUS/ANXIOUS: 0

## 2019-09-24 ASSESSMENT — PATIENT HEALTH QUESTIONNAIRE - PHQ9: CLINICAL INTERPRETATION OF PHQ2 SCORE: 0

## 2019-09-24 NOTE — TELEPHONE ENCOUNTER
Can you please help me figure out how he can get:  A walking appliance such as a cane or 3-claw cane.    Ideally a company can go to him within the group home.

## 2019-09-24 NOTE — PROGRESS NOTES
"Subjective:      Jn Pryor is a 53 y.o. male who presents with Follow-Up (Seizure disorder)    Here with case coordinator today, she is not involved in her daily care.      HPI  Seen today for urgent concerns.    1) Seemed to get worse during physical therapy for three weeks.  2) Seems more forgetful  3) Falling more-- one situation has been falling on the stairs and off the ramp.  He has had some near-misses and his feet appear to be getting ahead of his \"brain\".  Has had a small fracture of the wrist.    Mood disorder:  Followed by Dr Bills for psychiatry (based out [facility] and visits are conducted via tele-med).  Sometimes he may feel sad.  Generally speaking his mood has been stable.  No medication changes in a long time.  Sees his counselor weekly.     Last known seizure was in 2008.  He was experiencing a tremor which was reduced with starting carnitor.     Does not report that he is more tired throughout the day.    No recent labs drawn.     Osteopenia:  Recognized on DEXA scan in 2008.  No apparent repeat study.     Lives in a group home.  He helps with chores as well, enjoys cleaning and doing dishes.  Works at [employer].       Ref. Range 4/23/2018 08:25   Levetiracetam S Latest Ref Range: 10.0 - 40.0 ug/mL 39.0   Valproic Acid Latest Ref Range: 50 - 100 ug/mL 68        Current Outpatient Medications   Medication Sig Dispense Refill   • levOCARNitine (CARNITOR) 330 MG Tab TAKE 1 TABLET BY MOUTH ONCE DAILY 30 Tab 11   • levETIRAcetam (KEPPRA) 500 MG Tab TAKE 3 TABLETS BY MOUTH 2 TIMES A  Tab 11   • divalproex (DEPAKOTE) 500 MG Tablet Delayed Response TAKE 2 TABLET BY MOUTH EVERY MORNING AND 3 TABLETS EVERY NIGHT AT BEDTIME 150 Tab 11   • alendronate (FOSAMAX) 70 MG Tab Take 70 mg by mouth every 7 days.     • potassium chloride (KLOR-CON) 20 MEQ Pack Take 20 mEq by mouth 2 times a day.     • naproxen (NAPROSYN) 500 MG Tab Take 500 mg by mouth 2 times a day, with meals.     • risperidone " (RISPERDAL) 2 MG TABS Take 2 mg by mouth 2 times a day.     • saliva substitute (BIOTENE) SOLN Spray  in mouth/throat as needed.     • Diclofenac Sodium (VOLTAREN) 1 % GEL Apply  to skin as directed.     • Nutritional Supplements (ENSURE CLEAR PO) Take  by mouth.     • sertraline (ZOLOFT) 100 MG TABS Take 100 mg by mouth 2 Times a Day.     • travoprost (TRAVATAN Z) 0.004 % SOLN Place 1 Drop in both eyes every evening.     • famotidine (PEPCID) 20 MG TABS Take 20 mg by mouth 2 times a day.     • Multiple Vitamins-Minerals (MULTILEX-T&M PO) Take  by mouth.     • benztropine (COGENTIN) 1 MG TABS Take 1 mg by mouth 2 times a day.     • Calcium Carb-Cholecalciferol (OYSTER SHELL CALCIUM + D PO) Take  by mouth.       No current facility-administered medications for this visit.          Review of Systems   Constitutional: Positive for weight loss (2# loss).   HENT: Negative for hearing loss, nosebleeds and sore throat.         No recent head injury.   Eyes: Negative for double vision.        No new loss of vision.   Respiratory: Negative for cough.         No recent lung infections.   Cardiovascular: Negative for chest pain.   Gastrointestinal: Positive for constipation. Negative for abdominal pain, diarrhea, nausea and vomiting.   Genitourinary: Negative.    Musculoskeletal: Positive for falls and joint pain (hip pains).   Skin: Negative.    Neurological: Negative for dizziness and headaches.   Endo/Heme/Allergies:        No history of endocrine dysfunction.  No new problems.   Psychiatric/Behavioral: Positive for depression and memory loss. The patient is not nervous/anxious.         No recent mood changes.          Objective:     /80 (BP Location: Left arm, Patient Position: Sitting, BP Cuff Size: Adult)   Pulse 66   Temp 36.1 °C (97 °F) (Temporal)   Resp 16   Wt 76.2 kg (168 lb)   SpO2 97%   BMI 21.00 kg/m²      Physical Exam   Constitutional: He is oriented to person, place, and time. He appears  well-developed and well-nourished. No distress.   HENT:   Head: Normocephalic and atraumatic.   No new hearing loss.   Eyes: EOM are normal.   No double vision or loss of vision.    Wears glasses full-time.   Neck: Normal range of motion.   Cardiovascular: Normal rate and regular rhythm.   No recent chest pain.   Pulmonary/Chest: Effort normal.   Abdominal: There is no tenderness.   Genitourinary:   Genitourinary Comments: No recent infections.   Lymphadenopathy:     He has no cervical adenopathy.   Neurological: He is alert and oriented to person, place, and time. He has normal strength. He displays no tremor. No cranial nerve deficit. He displays no seizure activity. Coordination and gait (Foot drop right > left, stiff right hip joint/guarded ambulation.) abnormal.   Reflex Scores:       Tricep reflexes are 3+ on the right side and 3+ on the left side.       Bicep reflexes are 3+ on the right side and 3+ on the left side.       Brachioradialis reflexes are 3+ on the right side and 3+ on the left side.  No observable changes in neurologic status.  See initial new patient examination for details.     Skin: Skin is warm and dry. There is pallor.   Psychiatric: His mood appears not anxious. He is slowed. Cognition and memory are impaired. He exhibits a depressed mood (with more of a flat affect today).           Assessment/Plan:     Seizure Disorder:  Last known seizure occurred in 2008.  No concern for seizures in the past year.     Mood disorder:  Overall stable. Followed by tele-medicine located in [place].  No recent medication changes.  Appears to have more of a flat affect today, reports intermittent sadness.     Moderate MR:  Lives in group home full-time.  Works in a sheltered environment.  Memory has declined-- has recently misplaced his dentures and is awaiting replacement.     Osteoporosis:  Last DEXA 2016.  Continue supplements as recommended.  Followed by PCP.     New, Falls:  Increased amount of falls,  often when traversing stairs or a ramp.  Has sustained at least 2 injuries from fall.    Recommend the usage of a walking appliance such as a cane or 3-claw cane.     Continue LEV 1500mg BID and VPA 1000mg-1500mg.  Continue Carnitor qam.     Asked staff to keep a fall diary.    Obtain labs as ordered.    Obtain REEG to evaluate for subclinical seizures.    Return for follow-up in 3 months or sooner if needed.      EDUCATION AND COUNSELING:  -Education was provided to the patient and/or family regarding diagnosis and prognosis. The chronic and unpredictable nature of the condition was discussed. There is increased risk for additional events, which may carry potential for significant injuries and death.    -We reviewed the current antiepileptic regimen. Potential side effects of antiepileptics were discussed at length, including but no limited to: hypersensitivity reactions (rash and others, some of which can be fatal), visual field changes (some of which may be irreversible), glaucoma, diplopia, kidney stones, osteopenia/osteoporosis/bone fractures, hyperthermia/anhydrosis, tremors, ataxia, dizziness, fatigue, increased risk for falls, cardiac arrhythmias/syncope, gastrointestinal (hepatitis, pancreatitis, gastritis, ulcers), gingival hypertrophy, drowsiness, sedation, anxiety/nervousness, increased risk for suicide, increased risk for depression, and psychosis. We reviewed drug-drug interactions and their potential effect on seizure control and medication side effects.    -Patient/family educated on SUDEP (Sudden Death in Epilepsy). Counseling was provided on the importance of strict medication and follow up compliance. The patient understands the risks associated with non-adherence with the medical plan as outlined, including but not limited to an increased risk for breakthrough seizures, which may contribute to injuries, disability, status epilepticus, and even death.    -Counseling was also provided on potential  effects of alcohol and other drugs, which may lower seizure threshold and/or affect the metabolism of antiepileptic drugs. I recommend avoidance of alcohol and illegal drugs.  -Recommend proper hydration, regular exercise, proper sleep hygiene (7-8 hrs of overnight sleep, avoid sleep deprivation).    -Other seizure precautions were discussed at length, including no diving, no skydiving, no unsupervised swimming, no Jacuzzi or bathing in bathtubs.    Pt agrees with plan.

## 2019-12-10 ENCOUNTER — OFFICE VISIT (OUTPATIENT)
Dept: NEUROLOGY | Facility: MEDICAL CENTER | Age: 54
End: 2019-12-10
Payer: MEDICAID

## 2019-12-10 VITALS
BODY MASS INDEX: 21.14 KG/M2 | DIASTOLIC BLOOD PRESSURE: 64 MMHG | RESPIRATION RATE: 16 BRPM | OXYGEN SATURATION: 94 % | TEMPERATURE: 96.8 F | WEIGHT: 170 LBS | HEIGHT: 75 IN | HEART RATE: 75 BPM | SYSTOLIC BLOOD PRESSURE: 106 MMHG

## 2019-12-10 DIAGNOSIS — G40.909 SEIZURE DISORDER (HCC): ICD-10-CM
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
## 2019-12-10 DIAGNOSIS — R40.0 SOMNOLENCE: ICD-10-CM

## 2019-12-10 PROCEDURE — 99214 OFFICE O/P EST MOD 30 MIN: CPT | Performed by: NURSE PRACTITIONER

## 2019-12-10 RX ORDER — LORAZEPAM 1 MG/1
TABLET ORAL
Qty: 20 TAB | Refills: 0 | Status: SHIPPED
Start: 2019-12-10 | End: 2020-02-24 | Stop reason: SDUPTHER

## 2019-12-10 RX ORDER — DIVALPROEX SODIUM 500 MG/1
TABLET, DELAYED RELEASE ORAL
Qty: 150 TAB | Refills: 5 | Status: SHIPPED
Start: 2019-12-10 | End: 2020-05-13

## 2019-12-10 ASSESSMENT — ENCOUNTER SYMPTOMS
MEMORY LOSS: 1
COUGH: 0
FALLS: 1
ABDOMINAL PAIN: 0
SEIZURES: 0
DIARRHEA: 0
NERVOUS/ANXIOUS: 0
HEADACHES: 0
WEAKNESS: 1
VOMITING: 0
NAUSEA: 0
DEPRESSION: 1
SORE THROAT: 0
DOUBLE VISION: 0

## 2019-12-10 NOTE — PROGRESS NOTES
"Subjective:      Jn Pryor is a 54 y.o. male who presents with Follow-Up (Fall, subsequent encounter)          HPI  Here with case coordinator today.    There is concern for lab value changes-- results are not available to be reviewed.    Intercurrently Hem/Onc has reviewed his labs since 2012-- there has not been a drastic change.  Has had a liver and stomach ultrasound which are normal.    Falls: at least one time per week.  Has banged up his chin and he wants his chin to heal.    Right heel dragging.  Very focused on his feet so he doesn't trip.  When he is looking down at his feet then he tends to bump into walls and other stationary items.    Mood disorder/Schizophrenia, h/o auditory hallucination:  Followed by Dr Bills for psychiatry (based out of [facility] and visits are conducted via tele-med).  Sometimes he may feel sad.  Generally speaking his mood has been stable.  No medication changes in a long time. Sees his counselor weekly    Very low energy-- he often will nap during the day and is sleeping well at night as well.    Last known seizure was in 2008. Had an episode in 2009. He was experiencing a tremor which was reduced with starting carnitor.     Memory: threw away his dentures \"because they were dirty\".  Placing items in unusual places.    Has had an increase in daytime and nighttime accidents-- loss of bladder control only.    No recent labs drawn.     Osteoporosis:  Recognized on DEXA scan in 2008.  No apparent repeat study.     Lives in a group home.  He helps with chores as well, enjoys cleaning and doing dishes.  Works at [employer].       Ref. Range 4/23/2018 08:25   Levetiracetam S Latest Ref Range: 10.0 - 40.0 ug/mL 39.0   Valproic Acid Latest Ref Range: 50 - 100 ug/mL 68       Last EEG in 2008 per Dr Tavares-- right anterior temporal spike.  No MRI on record.    Current Outpatient Medications   Medication Sig Dispense Refill   • LORazepam (ATIVAN) 1 MG Tab Take 1/2-1 tablet PO PRN " breakthrough seizures.  Do not exceed more than 2 tablets in 24 hours unless directed otherwise by physician. 20 Tab 0   • divalproex (DEPAKOTE) 500 MG Tablet Delayed Response TAKE 2 TABLETS BY MOUTH EVERY MORNING AND 3 TABLETS EVERY NIGHT AT BEDTIME.  Begin tapering of Depakote as follows:    Take one tablet by mouth every morning and 3 tablets po qhs X 2 weeks then Take one tablet by mouth every morning and 2 tablets po qhs X 2 weeks then one tablet PO BID X 2 weeks the 150 Tab 5   • levOCARNitine (CARNITOR) 330 MG Tab TAKE 1 TABLET BY MOUTH EVERY  Tab 1   • levETIRAcetam (KEPPRA) 500 MG Tab TAKE 3 TABLETS BY MOUTH 2 TIMES A  Tab 5   • alendronate (FOSAMAX) 70 MG Tab Take 70 mg by mouth every 7 days.     • potassium chloride (KLOR-CON) 20 MEQ Pack Take 20 mEq by mouth 2 times a day.     • naproxen (NAPROSYN) 500 MG Tab Take 500 mg by mouth 2 times a day, with meals.     • risperidone (RISPERDAL) 2 MG TABS Take 2 mg by mouth 2 times a day.     • saliva substitute (BIOTENE) SOLN Spray  in mouth/throat as needed.     • Diclofenac Sodium (VOLTAREN) 1 % GEL Apply  to skin as directed.     • Nutritional Supplements (ENSURE CLEAR PO) Take  by mouth.     • sertraline (ZOLOFT) 100 MG TABS Take 100 mg by mouth 2 Times a Day.     • travoprost (TRAVATAN Z) 0.004 % SOLN Place 1 Drop in both eyes every evening.     • famotidine (PEPCID) 20 MG TABS Take 20 mg by mouth 2 times a day.     • Multiple Vitamins-Minerals (MULTILEX-T&M PO) Take  by mouth.     • benztropine (COGENTIN) 1 MG TABS Take 1 mg by mouth 2 times a day.     • Calcium Carb-Cholecalciferol (OYSTER SHELL CALCIUM + D PO) Take  by mouth.       No current facility-administered medications for this visit.          Review of Systems   Constitutional: Positive for malaise/fatigue.   HENT: Negative for hearing loss, nosebleeds and sore throat.         No recent head injury.   Eyes: Negative for double vision.        No new loss of vision.   Respiratory:  "Negative for cough.         No recent lung infections.   Cardiovascular: Negative for chest pain.   Gastrointestinal: Negative for abdominal pain, diarrhea, nausea and vomiting.   Genitourinary: Negative.    Musculoskeletal: Positive for falls and joint pain (hip pains).   Skin: Negative.    Neurological: Positive for weakness. Negative for seizures and headaches.   Endo/Heme/Allergies:        No history of endocrine dysfunction.  No new problems.   Psychiatric/Behavioral: Positive for depression and memory loss. The patient is not nervous/anxious.         No recent mood changes.          Objective:     /64 (BP Location: Right arm, Patient Position: Sitting, BP Cuff Size: Adult)   Pulse 75   Temp 36 °C (96.8 °F) (Temporal)   Resp 16   Ht 1.905 m (6' 3\")   Wt 77.1 kg (170 lb)   SpO2 94%   BMI 21.25 kg/m²      Physical Exam  Constitutional:       General: He is not in acute distress.     Appearance: He is well-developed.   HENT:      Head: Atraumatic.      Nose: Nose normal.   Eyes:      Extraocular Movements: Extraocular movements intact.      Comments: No double vision or loss of vision.   Neck:      Musculoskeletal: Normal range of motion and neck supple.   Cardiovascular:      Rate and Rhythm: Normal rate and regular rhythm.      Heart sounds: Normal heart sounds.      Comments: No recent chest pain.  Pulmonary:      Effort: Pulmonary effort is normal.   Abdominal:      Tenderness: There is no tenderness.   Genitourinary:     Comments: No recent infections.  Lymphadenopathy:      Cervical: No cervical adenopathy.   Skin:     General: Skin is warm and dry.      Coloration: Skin is pale.   Neurological:      Mental Status: He is alert and oriented to person, place, and time.      Cranial Nerves: No cranial nerve deficit.      Motor: No tremor or seizure activity.      Coordination: Coordination abnormal.      Gait: Gait abnormal ( Foot drop right > left, stiff right hip joint/guarded ambulation).      " Deep Tendon Reflexes:      Reflex Scores:       Tricep reflexes are 3+ on the right side and 3+ on the left side.       Bicep reflexes are 3+ on the right side and 3+ on the left side.       Brachioradialis reflexes are 3+ on the right side and 3+ on the left side.     Comments: No observable changes in neurologic status.  See initial new patient examination for details.     Psychiatric:         Mood and Affect: Mood is anxious and depressed. Affect is flat.         Speech: Speech normal.         Behavior: Behavior is slowed.         Cognition and Memory: Cognition is impaired.               Assessment/Plan:     Seizure Disorder:  Last known seizure occurred in 2008.  No concern for seizures in the past year.     Mood disorder:  Overall stable. Followed by tele-medicine located in [place].  No recent medication changes.  Appears to have more of a flat affect today, reports intermittent sadness.     Moderate MR:  Lives in group home full-time.  Works in a sheltered environment.  Memory has declined-- has recently misplaced his dentures and is awaiting replacement.     Osteoporosis:  Last DEXA 2016.  Continue supplements as recommended.  Followed by PCP.     New, Falls:  Increased amount of falls, often when traversing stairs or a ramp.  Has sustained at least 2 injuries from fall.       1) Obtain labs as ordered now and qmonth.    2) Obtain Brain MRI with/without to evaluate CNS etiology of neurologic changes.    3) Obtain REEG to evaluate for subclinical seizures.    4) Begin tapering off of Depakote as advised per hematologist.  Will taper from total dose of Depakote DR 1000mg-1500mg by 500mg every 2 weeks at the fastest.    5) Discussed urgent and emergent scenarios.  New Rx for ativan provided.    Asked for staff to stay in close telephone contact.    Return for follow-up in 3-4 months or sooner if needed.      EDUCATION AND COUNSELING:  -Education was provided to the patient and/or family regarding diagnosis  and prognosis. The chronic and unpredictable nature of the condition was discussed. There is increased risk for additional events, which may carry potential for significant injuries and death.    -We reviewed the current antiepileptic regimen. Potential side effects of antiepileptics were discussed at length, including but no limited to: hypersensitivity reactions (rash and others, some of which can be fatal), visual field changes (some of which may be irreversible), glaucoma, diplopia, kidney stones, osteopenia/osteoporosis/bone fractures, hyperthermia/anhydrosis, tremors, ataxia, dizziness, fatigue, increased risk for falls, cardiac arrhythmias/syncope, gastrointestinal (hepatitis, pancreatitis, gastritis, ulcers), gingival hypertrophy, drowsiness, sedation, anxiety/nervousness, increased risk for suicide, increased risk for depression, and psychosis. We reviewed drug-drug interactions and their potential effect on seizure control and medication side effects.    -Patient/family educated on SUDEP (Sudden Death in Epilepsy). Counseling was provided on the importance of strict medication and follow up compliance. The patient understands the risks associated with non-adherence with the medical plan as outlined, including but not limited to an increased risk for breakthrough seizures, which may contribute to injuries, disability, status epilepticus, and even death.    -Counseling was also provided on potential effects of alcohol and other drugs, which may lower seizure threshold and/or affect the metabolism of antiepileptic drugs. I recommend avoidance of alcohol and illegal drugs.  -Recommend proper hydration, regular exercise, proper sleep hygiene (7-8 hrs of overnight sleep, avoid sleep deprivation).    -. Other seizure precautions were discussed at length, including no diving, no skydiving, no unsupervised swimming, no Jacuzzi or bathing in bathtubs.      Pt agrees with plan.

## 2019-12-16 LAB
25(OH)D3+25(OH)D2 SERPL-MCNC: 43.3 NG/ML (ref 30–100)
ALBUMIN SERPL-MCNC: 3.9 G/DL (ref 3.5–5.5)
ALBUMIN/GLOB SERPL: 1.9 {RATIO} (ref 1.2–2.2)
ALP SERPL-CCNC: 63 IU/L (ref 39–117)
AMMONIA PLAS-MCNC: 98 UG/DL (ref 27–102)
AST SERPL-CCNC: 24 IU/L (ref 0–40)
BASOPHILS # BLD AUTO: 0.1 X10E3/UL (ref 0–0.2)
BASOPHILS NFR BLD AUTO: 2 %
BILIRUB SERPL-MCNC: 0.4 MG/DL (ref 0–1.2)
BUN SERPL-MCNC: 21 MG/DL (ref 6–24)
BUN/CREAT SERPL: 22 (ref 9–20)
CALCIUM SERPL-MCNC: 9.4 MG/DL (ref 8.7–10.2)
CHLORIDE SERPL-SCNC: 107 MMOL/L (ref 96–106)
CREAT SERPL-MCNC: 0.95 MG/DL (ref 0.76–1.27)
EOSINOPHIL # BLD AUTO: 0.1 X10E3/UL (ref 0–0.4)
EOSINOPHIL NFR BLD AUTO: 3 %
ERYTHROCYTE [DISTWIDTH] IN BLOOD BY AUTOMATED COUNT: 13.9 % (ref 12.3–15.4)
GLOBULIN SER CALC-MCNC: 2.1 G/DL (ref 1.5–4.5)
GLUCOSE SERPL-MCNC: 91 MG/DL (ref 65–99)
HCT VFR BLD AUTO: 33.8 % (ref 37.5–51)
HGB BLD-MCNC: 11.2 G/DL (ref 13–17.7)
IMM GRANULOCYTES # BLD AUTO: ABNORMAL 10*3/UL
IMM GRANULOCYTES NFR BLD AUTO: ABNORMAL %
IMMATURE CELLS  115398: ABNORMAL
IRON SATN MFR SERPL: 31 % (ref 15–55)
IRON SERPL-MCNC: 88 UG/DL (ref 38–169)
LYMPHOCYTES # BLD AUTO: 0.9 X10E3/UL (ref 0.7–3.1)
LYMPHOCYTES NFR BLD AUTO: 34 %
MCH RBC QN AUTO: 32.7 PG (ref 26.6–33)
MCHC RBC AUTO-ENTMCNC: 33.1 G/DL (ref 31.5–35.7)
MCV RBC AUTO: 99 FL (ref 79–97)
MONOCYTES # BLD AUTO: 0.3 X10E3/UL (ref 0.1–0.9)
MONOCYTES NFR BLD AUTO: 10 %
MORPHOLOGY BLD-IMP: ABNORMAL
NEUTROPHILS # BLD AUTO: 1.4 X10E3/UL (ref 1.4–7)
NEUTROPHILS NFR BLD AUTO: 51 %
NRBC BLD AUTO-RTO: ABNORMAL %
PLATELET # BLD AUTO: 68 X10E3/UL (ref 150–450)
POTASSIUM SERPL-SCNC: 4.1 MMOL/L (ref 3.5–5.2)
PROT SERPL-MCNC: 6 G/DL (ref 6–8.5)
RBC # BLD AUTO: 3.43 X10E6/UL (ref 4.14–5.8)
SODIUM SERPL-SCNC: 144 MMOL/L (ref 134–144)
T4 FREE SERPL-MCNC: 0.9 NG/DL (ref 0.82–1.77)
TIBC SERPL-MCNC: 282 UG/DL (ref 250–450)
TSH SERPL DL<=0.005 MIU/L-ACNC: 3.44 UIU/ML (ref 0.45–4.5)
UIBC SERPL-MCNC: 194 UG/DL (ref 111–343)
VALPROATE FREE SERPL-MCNC: 16.5 UG/ML (ref 6–22)
VIT B12 SERPL-MCNC: 800 PG/ML (ref 232–1245)
WBC # BLD AUTO: 2.7 X10E3/UL (ref 3.4–10.8)

## 2019-12-20 ENCOUNTER — TELEPHONE (OUTPATIENT)
Dept: NEUROLOGY | Facility: MEDICAL CENTER | Age: 54
End: 2019-12-20

## 2020-02-19 DIAGNOSIS — G40.909 SEIZURE DISORDER (HCC): ICD-10-CM

## 2020-02-19 NOTE — TELEPHONE ENCOUNTER
Received request via: Pharmacy  LAST SEEN 12/10/19    Was the patient seen in the last year in this department? Yes    Does the patient have an active prescription (recently filled or refills available) for medication(s) requested? No    NEXT APPT NOT SCHEDULED

## 2020-02-24 ENCOUNTER — TELEPHONE (OUTPATIENT)
Dept: NEUROLOGY | Facility: MEDICAL CENTER | Age: 55
End: 2020-02-24

## 2020-02-24 RX ORDER — LORAZEPAM 1 MG/1
TABLET ORAL
Qty: 20 TAB | Refills: 0 | Status: SHIPPED
Start: 2020-02-24 | End: 2021-01-20 | Stop reason: SDUPTHER

## 2020-05-13 ENCOUNTER — OFFICE VISIT (OUTPATIENT)
Dept: NEUROLOGY | Facility: MEDICAL CENTER | Age: 55
End: 2020-05-13
Payer: MEDICAID

## 2020-05-13 ENCOUNTER — TELEPHONE (OUTPATIENT)
Dept: NEUROLOGY | Facility: MEDICAL CENTER | Age: 55
End: 2020-05-13

## 2020-05-13 VITALS
OXYGEN SATURATION: 95 % | RESPIRATION RATE: 16 BRPM | HEIGHT: 72 IN | WEIGHT: 168.87 LBS | SYSTOLIC BLOOD PRESSURE: 120 MMHG | DIASTOLIC BLOOD PRESSURE: 70 MMHG | TEMPERATURE: 97.9 F | HEART RATE: 77 BPM | BODY MASS INDEX: 22.87 KG/M2

## 2020-05-13 DIAGNOSIS — F39 MOOD DISORDER (HCC): ICD-10-CM

## 2020-05-13 DIAGNOSIS — F71 MODERATE INTELLECTUAL DISABILITY: ICD-10-CM

## 2020-05-13 DIAGNOSIS — M81.8 OTHER OSTEOPOROSIS, UNSPECIFIED PATHOLOGICAL FRACTURE PRESENCE: ICD-10-CM
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
## 2020-05-13 DIAGNOSIS — G40.909 SEIZURE DISORDER (HCC): ICD-10-CM

## 2020-05-13 PROCEDURE — 99214 OFFICE O/P EST MOD 30 MIN: CPT | Performed by: NURSE PRACTITIONER

## 2020-05-13 ASSESSMENT — ENCOUNTER SYMPTOMS
NERVOUS/ANXIOUS: 0
WEAKNESS: 1
DIARRHEA: 0
COUGH: 0
HEADACHES: 0
MEMORY LOSS: 1
DEPRESSION: 1
DOUBLE VISION: 0
ABDOMINAL PAIN: 0
NAUSEA: 0
VOMITING: 0
SORE THROAT: 0

## 2020-05-13 ASSESSMENT — PATIENT HEALTH QUESTIONNAIRE - PHQ9: CLINICAL INTERPRETATION OF PHQ2 SCORE: 0

## 2020-05-13 NOTE — PROGRESS NOTES
"Subjective:      Jn Pryor is a 54 y.o. male who presents with Follow-Up (Epilepsy symptomatic, generalized- he said he had seizure)          HPI Here with case coordinator today.     Self-reports a small seizure that occurred just last weee.  He told the morning staff the next day that he had a seizure the evening before.    There is concern for lab value changes-- results are not available to be reviewed.     Intercurrently Hem/Onc has reviewed his labs since 2012-- there has not been a drastic change.  Has had a liver and stomach ultrasound which are normal.     Falls: at least one time per week.  Has banged up his chin and he wants his chin to heal.     Right heel dragging.  Very focused on his feet so he doesn't trip.  When he is looking down at his feet then he tends to bump into walls and other stationary items.     Mood disorder/Schizophrenia, h/o auditory hallucination:  Followed by Dr Bills for psychiatry (based out [facility] and visits are conducted via tele-med).  Sometimes he may feel sad.  Generally speaking his mood has been stable.      No medication changes in a long time. Sees his counselor weekly.      He is self-reporting in a variety of ways.     Very low energy-- he often will nap during the day and is sleeping well at night as well.     Last known seizure was in 2008. Had an episode in 2009. He was experiencing a tremor which was reduced with starting carnitor.     Memory: threw away his dentures \"because they were dirty\".  Placing items in unusual places.     Has had an increase in daytime and nighttime accidents-- loss of bladder control only.     No recent labs drawn.     Osteoporosis:  Recognized on DEXA scan in 2008.  No apparent repeat study.     Lives in a group home.  He helps with chores as well, enjoys cleaning and doing dishes.  Works at [employer].       Ref. Range 4/23/2018 08:25   Levetiracetam S Latest Ref Range: 10.0 - 40.0 ug/mL 39.0   Valproic Acid Latest Ref " Range: 50 - 100 ug/mL 68         Last EEG in 2008 per Dr Tavares-- right anterior temporal spike.  No MRI on record.    Last dose of Depakote was the end of February.  Moved to a new group home in March-- he had lived in the same home for about 8 years.  He is now in an environment where it is busy and having activities.  He has been engaged with equal attention.     Managed for diverticulosis    Current Outpatient Medications   Medication Sig Dispense Refill   • levETIRAcetam (KEPPRA) 500 MG Tab TAKE 3 TABLETS BY MOUTH 2 TIMES A  Tab 1   • levOCARNitine (CARNITOR) 330 MG Tab TAKE 1 TABLET BY MOUTH EVERY DAY 30 Tab 2   • alendronate (FOSAMAX) 70 MG Tab Take 70 mg by mouth every 7 days.     • potassium chloride (KLOR-CON) 20 MEQ Pack Take 20 mEq by mouth 2 times a day.     • naproxen (NAPROSYN) 500 MG Tab Take 500 mg by mouth 2 times a day, with meals.     • risperidone (RISPERDAL) 2 MG TABS Take 2 mg by mouth 2 times a day.     • saliva substitute (BIOTENE) SOLN Spray  in mouth/throat as needed.     • Diclofenac Sodium (VOLTAREN) 1 % GEL Apply  to skin as directed.     • Nutritional Supplements (ENSURE CLEAR PO) Take  by mouth.     • sertraline (ZOLOFT) 100 MG TABS Take 100 mg by mouth 2 Times a Day.     • travoprost (TRAVATAN Z) 0.004 % SOLN Place 1 Drop in both eyes every evening.     • famotidine (PEPCID) 20 MG TABS Take 20 mg by mouth 2 times a day.     • Multiple Vitamins-Minerals (MULTILEX-T&M PO) Take  by mouth.     • benztropine (COGENTIN) 1 MG TABS Take 1 mg by mouth 2 times a day.     • Calcium Carb-Cholecalciferol (OYSTER SHELL CALCIUM + D PO) Take  by mouth.     • divalproex (DEPAKOTE) 500 MG Tablet Delayed Response TAKE 2 TABLETS BY MOUTH EVERY MORNING AND 3 TABLETS EVERY NIGHT AT BEDTIME.  Begin tapering of Depakote as follows:    Take one tablet by mouth every morning and 3 tablets po qhs X 2 weeks then Take one tablet by mouth every morning and 2 tablets po qhs X 2 weeks then one tablet PO  BID X 2 weeks the (Patient not taking: Reported on 5/13/2020) 150 Tab 5     No current facility-administered medications for this visit.          Review of Systems   Constitutional: Positive for malaise/fatigue.   HENT: Negative for hearing loss, nosebleeds and sore throat.         No recent head injury.   Eyes: Negative for double vision.        No new loss of vision.   Respiratory: Negative for cough.         No recent lung infections.   Cardiovascular: Negative for chest pain.   Gastrointestinal: Negative for abdominal pain, diarrhea, nausea and vomiting.   Genitourinary: Negative.    Musculoskeletal: Positive for joint pain (hip pains).   Skin: Negative.    Neurological: Positive for weakness. Negative for headaches.   Endo/Heme/Allergies:        No history of endocrine dysfunction.  No new problems.   Psychiatric/Behavioral: Positive for depression and memory loss. The patient is not nervous/anxious.         No recent mood changes.          Objective:     /70 (BP Location: Right arm, Patient Position: Sitting, BP Cuff Size: Adult)   Pulse 77   Temp 36.6 °C (97.9 °F)   Resp 16   Ht 1.829 m (6')   Wt 76.6 kg (168 lb 14 oz)   SpO2 95%   BMI 22.90 kg/m²      Physical Exam  Constitutional:       General: He is not in acute distress.     Appearance: He is well-developed.   HENT:      Head: Normocephalic and atraumatic.      Nose: Nose normal.   Eyes:      Pupils: Pupils are equal, round, and reactive to light.      Comments: No double vision or loss of vision.   Neck:      Musculoskeletal: Normal range of motion and neck supple.   Cardiovascular:      Rate and Rhythm: Normal rate and regular rhythm.      Heart sounds: Normal heart sounds.      Comments: No recent chest pain.  Pulmonary:      Effort: Pulmonary effort is normal.   Abdominal:      Tenderness: There is no abdominal tenderness.   Genitourinary:     Comments: No recent infections.  Lymphadenopathy:      Cervical: No cervical adenopathy.    Skin:     General: Skin is warm and dry.      Coloration: Skin is pale.   Neurological:      Mental Status: He is alert.      Cranial Nerves: No cranial nerve deficit.      Motor: No tremor or seizure activity.      Coordination: Coordination abnormal.      Gait: Gait abnormal ( Foot drop right > left, stiff right hip joint/guarded ambulation).      Deep Tendon Reflexes:      Reflex Scores:       Tricep reflexes are 3+ on the right side and 3+ on the left side.       Bicep reflexes are 3+ on the right side and 3+ on the left side.       Brachioradialis reflexes are 3+ on the right side and 3+ on the left side.     Comments: No observable changes in neurologic status.  See initial new patient examination for details.     Psychiatric:         Mood and Affect: Mood is anxious and depressed. Affect is flat.         Behavior: Behavior is slowed.         Cognition and Memory: Cognition is impaired. Memory is impaired.             Assessment/Plan:     Seizure Disorder:  Last known seizure occurred in 2008.  No concern for seizures in the past year.     Mood disorder:  Overall stable. Followed by tele-medicine located in [place].  No recent medication changes.  Still adapting to his new home which is busier but he does not receive as much one-on-one attention.  Appears to have more of a flat affect today, reports intermittent sadness.     Moderate MR:  Lives in group home full-time.  Works in a sheltered environment.    Osteoporosis:  Last DEXA 2019.  Continue supplements as recommended.  Followed by PCP and Dr Menjivar.     Falls:  Increased amount of falls, often when traversing stairs or a ramp.  Has sustained at least 2 injuries from fall in 2019.    Continue Keppra 1500mg BID.    No labs drawn since December 2019.  Awaiting to have labs drawn with at-home staff on Friday.    Obtain REEG to evaluate for subclinical seizures.     Asked for staff to stay in close telephone contact.    Return for follow-up in 3-4  months if needed.      EDUCATION AND COUNSELING:  -Education was provided to the patient and/or family regarding diagnosis and prognosis. The chronic and unpredictable nature of the condition was discussed. There is increased risk for additional events, which may carry potential for significant injuries and death.    -We reviewed the current antiepileptic regimen. Potential side effects of antiepileptics were discussed at length, including but no limited to: hypersensitivity reactions (rash and others, some of which can be fatal), visual field changes (some of which may be irreversible), glaucoma, diplopia, kidney stones, osteopenia/osteoporosis/bone fractures, hyperthermia/anhydrosis, tremors, ataxia, dizziness, fatigue, increased risk for falls, cardiac arrhythmias/syncope, gastrointestinal (hepatitis, pancreatitis, gastritis, ulcers), gingival hypertrophy, drowsiness, sedation, anxiety/nervousness, increased risk for suicide, increased risk for depression, and psychosis. We reviewed drug-drug interactions and their potential effect on seizure control and medication side effects.    -Patient/family educated on SUDEP (Sudden Death in Epilepsy). Counseling was provided on the importance of strict medication and follow up compliance. The patient understands the risks associated with non-adherence with the medical plan as outlined, including but not limited to an increased risk for breakthrough seizures, which may contribute to injuries, disability, status epilepticus, and even death.    -Counseling was also provided on potential effects of alcohol and other drugs, which may lower seizure threshold and/or affect the metabolism of antiepileptic drugs. I recommend avoidance of alcohol and illegal drugs.  -Recommend proper hydration, regular exercise, proper sleep hygiene (7-8 hrs of overnight sleep, avoid sleep deprivation).    -. Other seizure precautions were discussed at length, including no diving, no skydiving, no  unsupervised swimming, no Jacuzzi or bathing in bathtubs.       Pt agrees with plan.
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
## 2020-05-22 LAB
ALBUMIN SERPL-MCNC: 4.2 G/DL (ref 3.8–4.9)
ALBUMIN/GLOB SERPL: 1.8 {RATIO} (ref 1.2–2.2)
ALP SERPL-CCNC: 138 IU/L (ref 39–117)
ALT SERPL-CCNC: 35 IU/L (ref 0–44)
AMMONIA PLAS-MCNC: 44 UG/DL (ref 40–200)
AST SERPL-CCNC: 24 IU/L (ref 0–40)
BASOPHILS # BLD AUTO: 0 X10E3/UL (ref 0–0.2)
BASOPHILS NFR BLD AUTO: 1 %
BILIRUB SERPL-MCNC: 0.6 MG/DL (ref 0–1.2)
BUN SERPL-MCNC: 11 MG/DL (ref 6–24)
BUN/CREAT SERPL: 14 (ref 9–20)
CALCIUM SERPL-MCNC: 9.3 MG/DL (ref 8.7–10.2)
CHLORIDE SERPL-SCNC: 99 MMOL/L (ref 96–106)
CO2 SERPL-SCNC: 24 MMOL/L (ref 20–29)
CREAT SERPL-MCNC: 0.79 MG/DL (ref 0.76–1.27)
CRP SERPL HS-MCNC: 11.32 MG/L (ref 0–3)
EOSINOPHIL # BLD AUTO: 0.1 X10E3/UL (ref 0–0.4)
EOSINOPHIL NFR BLD AUTO: 4 %
ERYTHROCYTE [DISTWIDTH] IN BLOOD BY AUTOMATED COUNT: 13.3 % (ref 11.6–15.4)
GLOBULIN SER CALC-MCNC: 2.4 G/DL (ref 1.5–4.5)
GLUCOSE SERPL-MCNC: 135 MG/DL (ref 65–99)
HCT VFR BLD AUTO: 34.4 % (ref 37.5–51)
HGB BLD-MCNC: 11.6 G/DL (ref 13–17.7)
IMM GRANULOCYTES # BLD AUTO: 0 X10E3/UL (ref 0–0.1)
IMM GRANULOCYTES NFR BLD AUTO: 0 %
IMMATURE CELLS  115398: ABNORMAL
LEVETIRACETAM SERPL-MCNC: 45.9 UG/ML (ref 10–40)
LYMPHOCYTES # BLD AUTO: 0.9 X10E3/UL (ref 0.7–3.1)
LYMPHOCYTES NFR BLD AUTO: 25 %
MCH RBC QN AUTO: 31.4 PG (ref 26.6–33)
MCHC RBC AUTO-ENTMCNC: 33.7 G/DL (ref 31.5–35.7)
MCV RBC AUTO: 93 FL (ref 79–97)
MONOCYTES # BLD AUTO: 0.4 X10E3/UL (ref 0.1–0.9)
MONOCYTES NFR BLD AUTO: 10 %
MORPHOLOGY BLD-IMP: ABNORMAL
NEUTROPHILS # BLD AUTO: 2 X10E3/UL (ref 1.4–7)
NEUTROPHILS NFR BLD AUTO: 60 %
NRBC BLD AUTO-RTO: ABNORMAL %
PLATELET # BLD AUTO: 95 X10E3/UL (ref 150–450)
POTASSIUM SERPL-SCNC: 4.3 MMOL/L (ref 3.5–5.2)
PROT SERPL-MCNC: 6.6 G/DL (ref 6–8.5)
RBC # BLD AUTO: 3.7 X10E6/UL (ref 4.14–5.8)
SODIUM SERPL-SCNC: 140 MMOL/L (ref 134–144)
VALPROATE SERPL-MCNC: <4 UG/ML (ref 50–100)
WBC # BLD AUTO: 3.4 X10E3/UL (ref 3.4–10.8)

## 2020-05-26 ENCOUNTER — TELEPHONE (OUTPATIENT)
Dept: NEUROLOGY | Facility: MEDICAL CENTER | Age: 55
End: 2020-05-26

## 2020-05-26 NOTE — TELEPHONE ENCOUNTER
Jn's labs need to be reviewed by PCP.  His fasting glucose is quite elevated and so is his nondescript inflammatory marker.

## 2020-07-14 ENCOUNTER — TELEPHONE (OUTPATIENT)
Dept: NEUROLOGY | Facility: MEDICAL CENTER | Age: 55
End: 2020-07-14

## 2020-07-15 NOTE — TELEPHONE ENCOUNTER
Caregiver Cristine called asking about the order for the EEG as she states centralized scheduling does not have record of it.     Referral is placed in the chart and is ready to schedule.   Gave number to specialty scheduling.

## 2020-08-25 ENCOUNTER — NON-PROVIDER VISIT (OUTPATIENT)
Dept: NEUROLOGY | Facility: MEDICAL CENTER | Age: 55
End: 2020-08-25
Payer: MEDICAID

## 2020-08-25 DIAGNOSIS — G40.909 SEIZURE DISORDER (HCC): ICD-10-CM

## 2020-08-25 PROCEDURE — 95816 EEG AWAKE AND DROWSY: CPT | Performed by: PSYCHIATRY & NEUROLOGY

## 2020-08-25 NOTE — PROCEDURES
ROUTINE ELECTROENCEPHALOGRAM REPORT      Referring provider: CYRUS Perez.    DOS: 8/25/2020 (total recording of 32 minutes)    INDICATION:  Jn Pryor 54 y.o. male presenting with history of seizures.    CURRENT ANTIEPILEPTIC REGIMEN: Levetiracetam.    TECHNIQUE: 30 channel routine electroencephalogram (EEG) was performed in accordance with the international 10-20 system. The study was reviewed in bipolar and referential montages. The recording examined the patient during wakeful and drowsy state(s).     DESCRIPTION OF THE RECORD:  During the wakefulness, the background showed a symmetrical 12 hz alpha activity posteriorly with amplitude of 70 mV.  There was reactivity to eye closure/opening.  A normal anterior-posterior gradient was noted with faster beta frequencies seen anteriorly.  During drowsiness, theta/delta frequencies were seen.    During the sleep state, background shows diffuse high-amplitude 4-5 Hz delta activity.  Symmetrical high-amplitude sleep spindles and vertex sharps were seen in the leads over the central regions.     ACTIVATION PROCEDURES:   Hyperventilation was performed by the patient for a total of 3 minutes. The technician performing the test noted good effort. This technique produced electroencephalographic changes in keeping with the expected bilaterally synchronous, frontally predominant, high amplitude slow waves build up.     Intermittent Photic stimulation was performed in a stepwise fashion from 1 to 30 Hz and elicited a normal response (photic driving), most noticeable in the posterior leads.      ICTAL AND/OR INTERICTAL FINDINGS:   No focal or generalized epileptiform activity noted. No regional slowing was seen during this routine study.  No clinical events or seizures were reported or recorded during the study.     EKG: sampling of the EKG recording demonstrated sinus rhythm.       INTERPRETATION:  This is a normal routine EEG recording in the awake and drowsy  state(s).  Clinical correlation is recommended.    Note: A normal EEG does not rule out epilepsy.  If the clinical suspicion remains high for seizures, a prolonged recording to capture clinical or subclinical events may be helpful.        Cassius Beck MD   Epilepsy and Neurodiagnostics.   Clinical  of Neurology [facility].   Diplomate in Neurology, Epilepsy, and Electrodiagnostic Medicine.   Office: [phone]  Fax: [phone]

## 2020-09-02 ENCOUNTER — OFFICE VISIT (OUTPATIENT)
Dept: NEUROLOGY | Facility: MEDICAL CENTER | Age: 55
End: 2020-09-02
Payer: MEDICAID

## 2020-09-02 VITALS
HEART RATE: 65 BPM | DIASTOLIC BLOOD PRESSURE: 78 MMHG | TEMPERATURE: 98.3 F | OXYGEN SATURATION: 98 % | RESPIRATION RATE: 16 BRPM | WEIGHT: 158.29 LBS | BODY MASS INDEX: 21.47 KG/M2 | SYSTOLIC BLOOD PRESSURE: 118 MMHG

## 2020-09-02 DIAGNOSIS — F39 MOOD DISORDER (HCC): ICD-10-CM

## 2020-09-02 DIAGNOSIS — M81.0 OSTEOPOROSIS, UNSPECIFIED OSTEOPOROSIS TYPE, UNSPECIFIED PATHOLOGICAL FRACTURE PRESENCE: ICD-10-CM

## 2020-09-02 DIAGNOSIS — F71 MODERATE INTELLECTUAL DISABILITY: ICD-10-CM

## 2020-09-02 DIAGNOSIS — G40.909 SEIZURE DISORDER (HCC): ICD-10-CM
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
## 2020-09-02 PROCEDURE — 99214 OFFICE O/P EST MOD 30 MIN: CPT | Performed by: NURSE PRACTITIONER

## 2020-09-02 ASSESSMENT — FIBROSIS 4 INDEX: FIB4 SCORE: 2.26

## 2020-09-02 NOTE — PROGRESS NOTES
"Subjective:      Jn Pryor is a 54 y.o. male who presents with Follow-Up (Seizure disorder , follow up on EEG )            HPI  Here with case coordinator today, Angeline.     Last known seizure was in 2008. Had an episode in 2009. He was experiencing a tremor which was reduced with starting carnitor.    Self reported one time possible seizure in March 12, 2020.  He had just moved to a new home on March 4, 2020.  Tapered off of the Depakote the end of February 2020.    8/25/2020 INTERPRETATION:  This is a normal routine EEG recording in the awake and drowsy state(s).  Clinical correlation is recommended.     Note: A normal EEG does not rule out epilepsy.  If the clinical suspicion remains high for seizures, a prolonged recording to capture clinical or subclinical events may be helpful.    He has adjusted well with his house mates.  Loves playing pool a few times per week.     Intercurrently Hem/Onc has reviewed his labs since 2012-- there has not been a drastic change.  Has had a liver and stomach ultrasound which are normal.     Falls:   No recent concern for falls.  Right heel dragging.  Very focused on his feet so he doesn't trip.  When he is looking down at his feet then he tends to bump into walls and other stationary items.     Mood disorder/Schizophrenia, h/o auditory hallucination:  Followed by Dr Bills for psychiatry (based out of [facility] and visits are conducted via tele-med).  Sometimes he may feel sad.  Generally speaking his mood has been stable.     No medication changes in a long time. Sees his counselor weekly.      Very low energy-- he often will nap during the day and is sleeping well at night as well.     Memory: threw away his dentures \"because they were dirty\" months ago. Placing items in unusual places.     Has had an increase in daytime and nighttime accidents-- loss of bladder control only.     Osteoporosis:  2018 DEXA IMPRESSION:  According to the World Health Organization " classification, bone mineral density of this patient is osteoporotic.     Lives in a group home.  He helps with chores as well, enjoys cleaning and doing dishes.  Works at [employer] 5 days per week.     Ref. Range 5/19/2020 05:03   Glucose Latest Ref Range: 65 - 99 mg/dL 135 (H)      Ref. Range 4/23/2018 08:25 12/12/2019 04:54 5/19/2020 05:03   Levetiracetam S Latest Ref Range: 10.0 - 40.0 ug/mL 39.0  45.9 (H)       Current Outpatient Medications   Medication Sig Dispense Refill   • levOCARNitine (CARNITOR) 330 MG Tab TAKE 1 TABLET BY MOUTH EVERY DAY 30 Tab 5   • levETIRAcetam (KEPPRA) 500 MG Tab TAKE 3 TABLETS BY MOUTH 2 TIMES A  Tab 5   • alendronate (FOSAMAX) 70 MG Tab Take 70 mg by mouth every 7 days.     • potassium chloride (KLOR-CON) 20 MEQ Pack Take 20 mEq by mouth 2 times a day.     • naproxen (NAPROSYN) 500 MG Tab Take 500 mg by mouth 2 times a day, with meals.     • risperidone (RISPERDAL) 2 MG TABS Take 2 mg by mouth 2 times a day.     • saliva substitute (BIOTENE) SOLN Spray  in mouth/throat as needed.     • Diclofenac Sodium (VOLTAREN) 1 % GEL Apply  to skin as directed.     • Nutritional Supplements (ENSURE CLEAR PO) Take  by mouth.     • sertraline (ZOLOFT) 100 MG TABS Take 100 mg by mouth 2 Times a Day.     • travoprost (TRAVATAN Z) 0.004 % SOLN Place 1 Drop in both eyes every evening.     • famotidine (PEPCID) 20 MG TABS Take 20 mg by mouth 2 times a day.     • Multiple Vitamins-Minerals (MULTILEX-T&M PO) Take  by mouth.     • benztropine (COGENTIN) 1 MG TABS Take 1 mg by mouth 2 times a day.     • Calcium Carb-Cholecalciferol (OYSTER SHELL CALCIUM + D PO) Take  by mouth.       No current facility-administered medications for this visit.        Review of Systems   Constitutional: Positive for malaise/fatigue.   HENT: Negative for hearing loss, nosebleeds and sore throat.         No recent head injury.   Eyes: Negative for double vision.        No new loss of vision.   Respiratory:  Negative for cough.         No recent lung infections.   Cardiovascular: Negative for chest pain.   Gastrointestinal: Negative for abdominal pain, diarrhea, nausea and vomiting.   Genitourinary: Negative.    Musculoskeletal: Positive for joint pain (hip pains).   Skin: Negative.    Neurological: Positive for weakness. Negative for headaches.   Endo/Heme/Allergies:        No history of endocrine dysfunction.  No new problems.   Psychiatric/Behavioral: Positive for memory loss. Negative for depression and hallucinations. The patient is not nervous/anxious.         No recent mood changes.          Objective:     /78 (BP Location: Left arm)   Pulse 65   Temp 36.8 °C (98.3 °F) (Temporal)   Resp 16   Wt 71.8 kg (158 lb 4.6 oz)   SpO2 98%   BMI 21.47 kg/m²      Physical Exam  Constitutional:       General: He is not in acute distress.     Appearance: He is well-developed.   HENT:      Head: Normocephalic and atraumatic.      Nose: Nose normal.      Mouth/Throat:      Mouth: Mucous membranes are moist.   Eyes:      Extraocular Movements: Extraocular movements intact.      Comments: No double vision or loss of vision.   Neck:      Musculoskeletal: Normal range of motion and neck supple.   Cardiovascular:      Rate and Rhythm: Normal rate and regular rhythm.      Heart sounds: Normal heart sounds.      Comments: No recent chest pain.  Pulmonary:      Effort: Pulmonary effort is normal.      Breath sounds: Normal breath sounds.   Abdominal:      Tenderness: There is no abdominal tenderness.   Genitourinary:     Comments: No recent infections.  Lymphadenopathy:      Cervical: No cervical adenopathy.   Skin:     General: Skin is warm and dry.      Coloration: Skin is pale.   Neurological:      Mental Status: He is alert.      Cranial Nerves: No cranial nerve deficit.      Motor: No tremor or seizure activity.      Coordination: Coordination abnormal.      Gait: Gait abnormal ( Foot drop right > left, stiff right hip  joint/guarded ambulation).      Deep Tendon Reflexes: Reflexes abnormal.      Reflex Scores:       Tricep reflexes are 3+ on the right side and 3+ on the left side.       Bicep reflexes are 3+ on the right side and 3+ on the left side.       Brachioradialis reflexes are 3+ on the right side and 3+ on the left side.       Patellar reflexes are 3+ on the right side and 3+ on the left side.     Comments: No observable changes in neurologic status.  See initial new patient examination for details.     Psychiatric:         Mood and Affect: Mood is depressed. Mood is not anxious. Affect is flat.         Speech: Speech normal.         Behavior: Behavior is slowed.         Cognition and Memory: Cognition is impaired. Memory is impaired.             Assessment/Plan:        Seizure Disorder:  Last known seizure occurred in 2008.  Self-reported a seizure in March 2020 shortly after moving to a new group home.    8/25/2020 REEG was normal.     Mood disorder:  Overall stable. Followed by tele-medicine located in [place].  No recent medication changes.  Has adapted well to his new environment and house mates.    Appears to have a more upbeat attitude today.     Moderate MR:  Lives in group home full-time.  Works in a sheltered environment.     Osteoporosis:  Last DEXA 2019.  Continue supplements as recommended.  Followed by PCP and Dr Menjivar.     Falls:  No recent concerns for falls.  Has sustained at least 2 injuries from fall in 2019.    Continue Keppra 1500mg BID.  Continue carnitor 330mg BID for treatment of tremor which has been effective for many years.      Ref. Range 5/19/2020 05:03   Levetiracetam S Latest Ref Range: 10.0 - 40.0 ug/mL 45.9 (H)   Valproic Acid Latest Ref Range: 50 - 100 ug/mL <4 (L)     Return for follow-up in 3-4 months if needed.        EDUCATION AND COUNSELING:  -Education was provided to the patient and/or family regarding diagnosis and prognosis. The chronic and unpredictable nature of the  condition was discussed. There is increased risk for additional events, which may carry potential for significant injuries and death.    -We reviewed the current antiepileptic regimen. Potential side effects of antiepileptics were discussed at length, including but no limited to: hypersensitivity reactions (rash and others, some of which can be fatal), visual field changes (some of which may be irreversible), glaucoma, diplopia, kidney stones, osteopenia/osteoporosis/bone fractures, hyperthermia/anhydrosis, tremors, ataxia, dizziness, fatigue, increased risk for falls, cardiac arrhythmias/syncope, gastrointestinal (hepatitis, pancreatitis, gastritis, ulcers), gingival hypertrophy, drowsiness, sedation, anxiety/nervousness, increased risk for suicide, increased risk for depression, and psychosis. We reviewed drug-drug interactions and their potential effect on seizure control and medication side effects.    -Patient/family educated on SUDEP (Sudden Death in Epilepsy). Counseling was provided on the importance of strict medication and follow up compliance. The patient understands the risks associated with non-adherence with the medical plan as outlined, including but not limited to an increased risk for breakthrough seizures, which may contribute to injuries, disability, status epilepticus, and even death.    -Counseling was also provided on potential effects of alcohol and other drugs, which may lower seizure threshold and/or affect the metabolism of antiepileptic drugs. I recommend avoidance of alcohol and illegal drugs.  -Recommend proper hydration, regular exercise, proper sleep hygiene (7-8 hrs of overnight sleep, avoid sleep deprivation).    -. Other seizure precautions were discussed at length, including no diving, no skydiving, no unsupervised swimming, no Jacuzzi or bathing in bathtubs.       Pt and staff agrees with plan.

## 2020-09-04 ASSESSMENT — ENCOUNTER SYMPTOMS
SORE THROAT: 0
COUGH: 0
ABDOMINAL PAIN: 0
DOUBLE VISION: 0
DIARRHEA: 0
HEADACHES: 0
NAUSEA: 0
DEPRESSION: 0
WEAKNESS: 1
HALLUCINATIONS: 0
VOMITING: 0
NERVOUS/ANXIOUS: 0
MEMORY LOSS: 1

## 2021-01-20 DIAGNOSIS — G40.909 SEIZURE DISORDER (HCC): ICD-10-CM

## 2021-01-20 RX ORDER — LORAZEPAM 1 MG/1
TABLET ORAL
Qty: 20 TAB | Refills: 0 | Status: SHIPPED
Start: 2021-01-20 | End: 2021-02-15

## 2021-01-20 NOTE — TELEPHONE ENCOUNTER
Received request via: Pharmacy    Was the patient seen in the last year in this department? Yes    Does the patient have an active prescription (recently filled or refills available) for medication(s) requested? No     Last seen 09/02/2020   Next appt 03/08/2021

## 2021-03-08 ENCOUNTER — OFFICE VISIT (OUTPATIENT)
Dept: NEUROLOGY | Facility: MEDICAL CENTER | Age: 56
End: 2021-03-08
Attending: NURSE PRACTITIONER
Payer: MEDICAID

## 2021-03-08 VITALS
HEART RATE: 75 BPM | OXYGEN SATURATION: 96 % | SYSTOLIC BLOOD PRESSURE: 112 MMHG | DIASTOLIC BLOOD PRESSURE: 70 MMHG | TEMPERATURE: 98.4 F | RESPIRATION RATE: 16 BRPM | HEIGHT: 72 IN | WEIGHT: 167.99 LBS | BODY MASS INDEX: 22.75 KG/M2

## 2021-03-08 DIAGNOSIS — G40.909 SEIZURE DISORDER (HCC): ICD-10-CM

## 2021-03-08 DIAGNOSIS — F71 MODERATE INTELLECTUAL DISABILITY: ICD-10-CM

## 2021-03-08 DIAGNOSIS — F39 MOOD DISORDER (HCC): ICD-10-CM

## 2021-03-08 PROCEDURE — 99214 OFFICE O/P EST MOD 30 MIN: CPT | Performed by: NURSE PRACTITIONER

## 2021-03-08 PROCEDURE — 99211 OFF/OP EST MAY X REQ PHY/QHP: CPT | Performed by: NURSE PRACTITIONER

## 2021-03-08 ASSESSMENT — ENCOUNTER SYMPTOMS
DIARRHEA: 0
ABDOMINAL PAIN: 0
NERVOUS/ANXIOUS: 0
DOUBLE VISION: 0
VOMITING: 0
WEAKNESS: 1
NAUSEA: 0
COUGH: 0
FALLS: 0
MEMORY LOSS: 1
MUSCULOSKELETAL NEGATIVE: 1
HEADACHES: 0
SORE THROAT: 0
DEPRESSION: 0

## 2021-03-08 ASSESSMENT — FIBROSIS 4 INDEX: FIB4 SCORE: 2.31

## 2021-03-08 NOTE — PROGRESS NOTES
Subjective:      Jn Pryor is a 55 y.o. male who presents with Follow-Up (Moderate intellectual disability)          HPI  Here with case coordinator today, Angeline.     Last known seizure was in 2008. Had an episode in 2009. He was experiencing a tremor which was reduced with starting carnitor.     Self reported one time possible seizure in March 12, 2020.  He had just moved to a new home on March 4, 2020.  Tapered off of the Depakote the end of February 2020.  He has continued to stay in the same group home which houses higher functioning residents which is a benefit for him.    He has adjusted well with his house mates.  Loves playing pool a few times per week.     8/25/2020 INTERPRETATION:  This is a normal routine EEG recording in the awake and drowsy state(s).  Clinical correlation is recommended.     Note: A normal EEG does not rule out epilepsy.  If the clinical suspicion remains high for seizures, a prolonged recording to capture clinical or subclinical events may be helpful.     Intercurrently Hem/Onc has reviewed his labs since 2012-- there has not been a drastic change.  Has had a liver and stomach ultrasound which are normal.     Falls:   No recent concern for falls.  Right heel dragging.  Very focused on his feet so he doesn't trip.  When he is looking down at his feet then he tends to bump into walls and other stationary items.     Mood disorder/Schizophrenia, h/o auditory hallucination:  Followed by Dr Bills for psychiatry (based out of [facility] and visits are conducted via tele-med).  Sometimes he may feel sad.  Generally speaking his mood has been stable.     No medication changes in a long time. Sees his counselor weekly.      Energy level:  He is having a hard time falling asleep until late and then will wake-up at 3-4am.  Then napping in the afternoon because there isn't anything to do, naps up to 3-4 hours then.     Has had an increase in daytime and nighttime accidents-- loss of bladder  control only.     Osteoporosis:  2018 DEXA IMPRESSION:  According to the World Health Organization classification, bone mineral density of this patient is osteoporotic.     Lives in a group home.  He helps with chores as well, enjoys cleaning and doing dishes.  Now back to work at [employer] 5 days per week.  Using his skills in the kitchen and setting the table.      Ref. Range 5/19/2020 05:03   Levetiracetam S Latest Ref Range: 10.0 - 40.0 ug/mL 45.9 (H)   Valproic Acid Latest Ref Range: 50 - 100 ug/mL <4 (L)      Ref. Range 12/12/2019 04:54   25-Hydroxy   Vitamin D 25 Latest Ref Range: 30.0 - 100.0 ng/mL 43.3     Has received Moderna COVID-19 X2 vaccination.    Current Outpatient Medications   Medication Sig Dispense Refill   • levOCARNitine (CARNITOR) 330 MG Tab TAKE 1 TABLET BY MOUTH EVERY DAY 30 tablet 3   • levETIRAcetam (KEPPRA) 500 MG Tab TAKE 3 TABLETS BY MOUTH 2 TIMES A  tablet 3   • alendronate (FOSAMAX) 70 MG Tab Take 70 mg by mouth every 7 days.     • potassium chloride (KLOR-CON) 20 MEQ Pack Take 20 mEq by mouth 2 times a day.     • naproxen (NAPROSYN) 500 MG Tab Take 500 mg by mouth 2 times a day, with meals.     • risperidone (RISPERDAL) 2 MG TABS Take 2 mg by mouth 2 times a day.     • saliva substitute (BIOTENE) SOLN Spray  in mouth/throat as needed.     • Diclofenac Sodium (VOLTAREN) 1 % GEL Apply  to skin as directed.     • Nutritional Supplements (ENSURE CLEAR PO) Take  by mouth.     • sertraline (ZOLOFT) 100 MG TABS Take 100 mg by mouth 2 Times a Day.     • travoprost (TRAVATAN Z) 0.004 % SOLN Place 1 Drop in both eyes every evening.     • famotidine (PEPCID) 20 MG TABS Take 20 mg by mouth 2 times a day.     • Multiple Vitamins-Minerals (MULTILEX-T&M PO) Take  by mouth.     • benztropine (COGENTIN) 1 MG TABS Take 1 mg by mouth 2 times a day.     • Calcium Carb-Cholecalciferol (OYSTER SHELL CALCIUM + D PO) Take  by mouth.       No current facility-administered medications for this  visit.      Review of Systems   Constitutional: Positive for malaise/fatigue.   HENT: Negative for hearing loss, nosebleeds and sore throat.         No recent head injury.   Eyes: Negative for double vision.        No new loss of vision.   Respiratory: Negative for cough.         No recent lung infections.   Cardiovascular: Negative for chest pain.   Gastrointestinal: Negative for abdominal pain, diarrhea, nausea and vomiting.   Genitourinary: Negative.    Musculoskeletal: Negative.  Negative for falls.   Skin: Negative.    Neurological: Positive for weakness. Negative for headaches.   Endo/Heme/Allergies:        No history of endocrine dysfunction.  No new problems.   Psychiatric/Behavioral: Positive for memory loss. Negative for depression. The patient is not nervous/anxious.         No recent mood changes.          Objective:     There were no vitals taken for this visit.     Physical Exam  Constitutional:       General: He is not in acute distress.     Appearance: He is well-developed.   HENT:      Head: Normocephalic and atraumatic.      Nose: Nose normal.   Eyes:      Pupils: Pupils are equal, round, and reactive to light.      Comments: No double vision or loss of vision.   Cardiovascular:      Rate and Rhythm: Normal rate and regular rhythm.      Heart sounds: Normal heart sounds.      Comments: No recent chest pain.  Pulmonary:      Effort: Pulmonary effort is normal.      Breath sounds: Normal breath sounds.   Abdominal:      Palpations: Abdomen is soft.      Tenderness: There is no abdominal tenderness.   Genitourinary:     Comments: No recent infections.  Musculoskeletal:      Cervical back: Normal range of motion and neck supple.   Lymphadenopathy:      Cervical: No cervical adenopathy.   Skin:     General: Skin is warm and dry.      Coloration: Skin is pale.   Neurological:      Mental Status: He is alert and oriented to person, place, and time.      Cranial Nerves: No cranial nerve deficit.       Motor: No tremor or seizure activity.      Coordination: Coordination abnormal.      Gait: Gait abnormal ( Foot drop right > left, stiff right hip joint/guarded ambulation).      Deep Tendon Reflexes: Reflexes abnormal.      Reflex Scores:       Tricep reflexes are 3+ on the right side and 3+ on the left side.       Bicep reflexes are 3+ on the right side and 3+ on the left side.       Brachioradialis reflexes are 3+ on the right side and 3+ on the left side.     Comments: No observable changes in neurologic status.  See initial new patient examination for details.     Psychiatric:         Mood and Affect: Mood is anxious (mild). Mood is not depressed. Affect is flat.         Behavior: Behavior is slowed.         Cognition and Memory: Cognition is impaired. Memory is impaired.               Assessment/Plan:        Seizure Disorder:  Last known seizure occurred in 2008.  No concern for seizures in the past year.     Mood disorder:  Overall stable. Followed by tele-medicine located in [place].  No recent medication changes.  Still adapting to his new home which is busier but he does not receive as much one-on-one attention.  Reports intermittent sadness but overall very stable.     Moderate MR:  Lives in group home full-time.  Works in a sheltered environment.     Osteoporosis:  Last DEXA 2019.  Continue supplements as recommended.  Followed by PCP and Dr Menjivar.     Falls:  Increased amount of falls, often when traversing stairs or a ramp.  Has sustained at least 2 injuries from fall in 2019.     Continue Keppra 1500mg BID.     Asked for staff to stay in close telephone contact.     Return for follow-up in 3-4 months if needed.        EDUCATION AND COUNSELING:  -Education was provided to the patient and/or family regarding diagnosis and prognosis. The chronic and unpredictable nature of the condition was discussed. There is increased risk for additional events, which may carry potential for significant injuries  and death.    -We reviewed the current antiepileptic regimen. Potential side effects of antiepileptics were discussed at length, including but no limited to: hypersensitivity reactions (rash and others, some of which can be fatal), visual field changes (some of which may be irreversible), glaucoma, diplopia, kidney stones, osteopenia/osteoporosis/bone fractures, hyperthermia/anhydrosis, tremors, ataxia, dizziness, fatigue, increased risk for falls, cardiac arrhythmias/syncope, gastrointestinal (hepatitis, pancreatitis, gastritis, ulcers), gingival hypertrophy, drowsiness, sedation, anxiety/nervousness, increased risk for suicide, increased risk for depression, and psychosis. We reviewed drug-drug interactions and their potential effect on seizure control and medication side effects.    -Patient/family educated on SUDEP (Sudden Death in Epilepsy). Counseling was provided on the importance of strict medication and follow up compliance. The patient understands the risks associated with non-adherence with the medical plan as outlined, including but not limited to an increased risk for breakthrough seizures, which may contribute to injuries, disability, status epilepticus, and even death.    -Counseling was also provided on potential effects of alcohol and other drugs, which may lower seizure threshold and/or affect the metabolism of antiepileptic drugs. I recommend avoidance of alcohol and illegal drugs.  -Recommend proper hydration, regular exercise, proper sleep hygiene (7-8 hrs of overnight sleep, avoid sleep deprivation).    -. Other seizure precautions were discussed at length, including no diving, no skydiving, no unsupervised swimming, no Jacuzzi or bathing in bathtubs.       Pt agrees with plan.

## 2021-08-31 DIAGNOSIS — G40.909 SEIZURE DISORDER (HCC): ICD-10-CM

## 2021-08-31 RX ORDER — LEVETIRACETAM 500 MG/1
TABLET ORAL
Qty: 180 TABLET | Refills: 1 | Status: SHIPPED | OUTPATIENT
Start: 2021-08-31

## 2021-08-31 NOTE — TELEPHONE ENCOUNTER
Received request via: Pharmacy    Was the patient seen in the last year in this department? Yes    Does the patient have an active prescription (recently filled or refills available) for medication(s) requested? Yes.   Was Delaney martínez Advised to schedule

## 2021-11-22 DIAGNOSIS — G40.909 SEIZURE DISORDER (HCC): ICD-10-CM

## 2021-11-23 RX ORDER — LEVOCARNITINE 330 MG/1
TABLET ORAL
Qty: 28 TABLET | Refills: 0 | Status: SHIPPED | OUTPATIENT
Start: 2021-11-23

## 2021-11-23 RX ORDER — MULTIVITAMIN WITH IRON
TABLET ORAL
Qty: 28 TABLET | Refills: 0 | Status: SHIPPED | OUTPATIENT
Start: 2021-11-23

## 2021-11-23 NOTE — TELEPHONE ENCOUNTER
Received request via: Pharmacy    Was the patient seen in the last year in this department? Yes    Does the patient have an active prescription (recently filled or refills available) for medication(s) requested? Yes.   ezekiel Baltazar pt

## 2022-12-09 NOTE — TELEPHONE ENCOUNTER
Attempted 4 separate times to call the patient with the number listed and it gave a busy tone each time. All emergency contacts have same number listed.     Letter will be written and sent.   
Please let case coordinator know that his lab results need to be addressed ASAP-- please have them call PCP.    
no abdominal pain, no bloating, no constipation, no diarrhea, no nausea and no vomiting.